# Patient Record
Sex: FEMALE | Race: WHITE | NOT HISPANIC OR LATINO | Employment: FULL TIME | URBAN - METROPOLITAN AREA
[De-identification: names, ages, dates, MRNs, and addresses within clinical notes are randomized per-mention and may not be internally consistent; named-entity substitution may affect disease eponyms.]

---

## 2018-01-12 ENCOUNTER — OFFICE VISIT (OUTPATIENT)
Dept: PSYCHIATRY | Facility: CLINIC | Age: 53
End: 2018-01-12
Payer: COMMERCIAL

## 2018-01-12 DIAGNOSIS — F43.20 ADJUSTMENT DISORDER, UNSPECIFIED TYPE: Primary | ICD-10-CM

## 2018-01-12 PROCEDURE — 90847 FAMILY PSYTX W/PT 50 MIN: CPT | Mod: S$GLB,,, | Performed by: SOCIAL WORKER

## 2018-01-15 NOTE — PROGRESS NOTES
Family Psychotherapy (PhD/LCSW)    1/12/2018    Site: [facility]    Length of service: 45    Therapeutic intervention: 90847-Family therapy with patient; needed because adjustment, communication issues, addiction     Persons present: patient, spouse and son     Interval history: Patient, patient's spouse, and patient's son presented to the clinic today for family therapy session.  Patient is tearful intermittently during session.  Reflects on her son's history of opioid use disorder.  She is optimistic about his recovery, as he has entered into Suboxone maintenance program at [facility] with Dr. Betancourt.  He has been attending AA meetings.  Patient and her spouse reflect on how they want to set adequate boundaries and limits for their son, as he enters recovery.  Discussed the construct of codependency with patient and her family.  Discussed boundaries, limit setting, and enabling behaviors.  Patient's son reports that he will be engaging in individual therapy in the clinic.  The family met with Dr. Betancourt prior to this appointment.  Recommended that patient and her spouse attend Al-Anon meetings.  They appear amenable to feedback from Sw.        Target symptoms: substance abuse, adjustment     Patient's interpersonal/verbal exchanges: 90847-Family therapy with patient:  active listening, frequent questions and self-disclosure    Progress toward goals: progressing slowly    Diagnosis: Adjustment disorder, unspecified type     Plan: family psychotherapy    Return to clinic: as needed

## 2020-04-21 DIAGNOSIS — Z01.84 ANTIBODY RESPONSE EXAMINATION: ICD-10-CM

## 2021-06-04 ENCOUNTER — OFFICE VISIT (OUTPATIENT)
Dept: PRIMARY CARE CLINIC | Facility: CLINIC | Age: 56
End: 2021-06-04
Payer: COMMERCIAL

## 2021-06-04 VITALS
HEART RATE: 78 BPM | RESPIRATION RATE: 16 BRPM | WEIGHT: 174.06 LBS | OXYGEN SATURATION: 98 % | BODY MASS INDEX: 27.97 KG/M2 | DIASTOLIC BLOOD PRESSURE: 68 MMHG | SYSTOLIC BLOOD PRESSURE: 124 MMHG | HEIGHT: 66 IN

## 2021-06-04 DIAGNOSIS — Z12.31 ENCOUNTER FOR SCREENING MAMMOGRAM FOR MALIGNANT NEOPLASM OF BREAST: ICD-10-CM

## 2021-06-04 DIAGNOSIS — Z11.4 ENCOUNTER FOR SCREENING FOR HIV: ICD-10-CM

## 2021-06-04 DIAGNOSIS — Z12.31 VISIT FOR SCREENING MAMMOGRAM: ICD-10-CM

## 2021-06-04 DIAGNOSIS — Z12.11 ENCOUNTER FOR SCREENING FOR MALIGNANT NEOPLASM OF COLON: ICD-10-CM

## 2021-06-04 DIAGNOSIS — E28.39 HYPOESTROGENISM: ICD-10-CM

## 2021-06-04 DIAGNOSIS — R79.89 LOW TESTOSTERONE LEVEL IN FEMALE: ICD-10-CM

## 2021-06-04 DIAGNOSIS — Z11.59 NEED FOR HEPATITIS C SCREENING TEST: ICD-10-CM

## 2021-06-04 DIAGNOSIS — R79.89 LOW SERUM PROGESTERONE: ICD-10-CM

## 2021-06-04 DIAGNOSIS — E03.9 HYPOTHYROIDISM, UNSPECIFIED TYPE: ICD-10-CM

## 2021-06-04 DIAGNOSIS — Z00.00 HEALTH CARE MAINTENANCE: ICD-10-CM

## 2021-06-04 DIAGNOSIS — B20 HIV INFECTION, UNSPECIFIED SYMPTOM STATUS: Primary | ICD-10-CM

## 2021-06-04 PROCEDURE — 99999 PR PBB SHADOW E&M-EST. PATIENT-LVL III: CPT | Mod: PBBFAC,,, | Performed by: FAMILY MEDICINE

## 2021-06-04 PROCEDURE — 99999 PR PBB SHADOW E&M-EST. PATIENT-LVL III: ICD-10-PCS | Mod: PBBFAC,,, | Performed by: FAMILY MEDICINE

## 2021-06-04 PROCEDURE — 99386 PR PREVENTIVE VISIT,NEW,40-64: ICD-10-PCS | Mod: S$GLB,,, | Performed by: FAMILY MEDICINE

## 2021-06-04 PROCEDURE — 99386 PREV VISIT NEW AGE 40-64: CPT | Mod: S$GLB,,, | Performed by: FAMILY MEDICINE

## 2021-06-07 ENCOUNTER — TELEPHONE (OUTPATIENT)
Dept: PRIMARY CARE CLINIC | Facility: CLINIC | Age: 56
End: 2021-06-07

## 2021-06-07 PROBLEM — E03.9 HYPOTHYROIDISM: Status: ACTIVE | Noted: 2021-06-07

## 2021-06-07 PROBLEM — E28.39 HYPOESTROGENISM: Status: ACTIVE | Noted: 2021-06-07

## 2021-06-07 PROBLEM — R79.89 LOW SERUM PROGESTERONE: Status: ACTIVE | Noted: 2021-06-07

## 2021-06-07 PROBLEM — R79.89 LOW TESTOSTERONE LEVEL IN FEMALE: Status: ACTIVE | Noted: 2021-06-07

## 2021-06-11 ENCOUNTER — TELEPHONE (OUTPATIENT)
Dept: PRIMARY CARE CLINIC | Facility: CLINIC | Age: 56
End: 2021-06-11

## 2021-06-15 LAB
ALBUMIN SERPL-MCNC: 4.2 G/DL (ref 3.6–5.1)
ALBUMIN/GLOB SERPL: 1.5 (CALC) (ref 1–2.5)
ALP SERPL-CCNC: 56 U/L (ref 37–153)
ALT SERPL-CCNC: 11 U/L (ref 6–29)
AST SERPL-CCNC: 14 U/L (ref 10–35)
BASOPHILS # BLD AUTO: 53 CELLS/UL (ref 0–200)
BASOPHILS NFR BLD AUTO: 0.9 %
BILIRUB SERPL-MCNC: 0.5 MG/DL (ref 0.2–1.2)
BUN SERPL-MCNC: 10 MG/DL (ref 7–25)
BUN/CREAT SERPL: NORMAL (CALC) (ref 6–22)
CALCIUM SERPL-MCNC: 9.1 MG/DL (ref 8.6–10.4)
CHLORIDE SERPL-SCNC: 103 MMOL/L (ref 98–110)
CHOLEST SERPL-MCNC: 206 MG/DL
CHOLEST/HDLC SERPL: 2.7 (CALC)
CO2 SERPL-SCNC: 27 MMOL/L (ref 20–32)
CREAT SERPL-MCNC: 0.66 MG/DL (ref 0.5–1.05)
DHEA-S SERPL-MCNC: 248 MCG/DL (ref 8–188)
EOSINOPHIL # BLD AUTO: 153 CELLS/UL (ref 15–500)
EOSINOPHIL NFR BLD AUTO: 2.6 %
ERYTHROCYTE [DISTWIDTH] IN BLOOD BY AUTOMATED COUNT: 12.4 % (ref 11–15)
ESTRADIOL SERPL-MCNC: <15 PG/ML
FSH SERPL-ACNC: 55.4 MIU/ML
GLOBULIN SER CALC-MCNC: 2.8 G/DL (CALC) (ref 1.9–3.7)
GLUCOSE SERPL-MCNC: 85 MG/DL (ref 65–99)
HCT VFR BLD AUTO: 35.4 % (ref 35–45)
HCV AB S/CO SERPL IA: 0.01
HCV AB SERPL QL IA: NORMAL
HDLC SERPL-MCNC: 76 MG/DL
HGB BLD-MCNC: 11.8 G/DL (ref 11.7–15.5)
HIV 1+2 AB+HIV1 P24 AG SERPL QL IA: NORMAL
LDLC SERPL CALC-MCNC: 115 MG/DL (CALC)
LH SERPL-ACNC: 24.8 MIU/ML
LYMPHOCYTES # BLD AUTO: 1540 CELLS/UL (ref 850–3900)
LYMPHOCYTES NFR BLD AUTO: 26.1 %
MCH RBC QN AUTO: 28.5 PG (ref 27–33)
MCHC RBC AUTO-ENTMCNC: 33.3 G/DL (ref 32–36)
MCV RBC AUTO: 85.5 FL (ref 80–100)
MONOCYTES # BLD AUTO: 401 CELLS/UL (ref 200–950)
MONOCYTES NFR BLD AUTO: 6.8 %
NEUTROPHILS # BLD AUTO: 3752 CELLS/UL (ref 1500–7800)
NEUTROPHILS NFR BLD AUTO: 63.6 %
NONHDLC SERPL-MCNC: 130 MG/DL (CALC)
PLATELET # BLD AUTO: 265 THOUSAND/UL (ref 140–400)
PMV BLD REES-ECKER: 10.1 FL (ref 7.5–12.5)
POTASSIUM SERPL-SCNC: 4.3 MMOL/L (ref 3.5–5.3)
PROGEST SERPL-MCNC: <0.5 NG/ML
PROT SERPL-MCNC: 7 G/DL (ref 6.1–8.1)
RBC # BLD AUTO: 4.14 MILLION/UL (ref 3.8–5.1)
SODIUM SERPL-SCNC: 139 MMOL/L (ref 135–146)
T4 FREE SERPL-MCNC: 1 NG/DL (ref 0.8–1.8)
TESTOST FREE SERPL-MCNC: 2.7 PG/ML (ref 0.2–5)
TRIGL SERPL-MCNC: 61 MG/DL
TSH SERPL-ACNC: 1.16 MIU/L
WBC # BLD AUTO: 5.9 THOUSAND/UL (ref 3.8–10.8)

## 2021-07-06 ENCOUNTER — PATIENT MESSAGE (OUTPATIENT)
Dept: ADMINISTRATIVE | Facility: HOSPITAL | Age: 56
End: 2021-07-06

## 2021-07-07 ENCOUNTER — PATIENT MESSAGE (OUTPATIENT)
Dept: RHEUMATOLOGY | Facility: CLINIC | Age: 56
End: 2021-07-07

## 2021-07-08 ENCOUNTER — PATIENT OUTREACH (OUTPATIENT)
Dept: ADMINISTRATIVE | Facility: HOSPITAL | Age: 56
End: 2021-07-08

## 2021-07-14 ENCOUNTER — TELEPHONE (OUTPATIENT)
Dept: PRIMARY CARE CLINIC | Facility: CLINIC | Age: 56
End: 2021-07-14

## 2021-07-14 ENCOUNTER — TELEPHONE (OUTPATIENT)
Dept: RHEUMATOLOGY | Facility: CLINIC | Age: 56
End: 2021-07-14

## 2021-09-16 ENCOUNTER — IMMUNIZATION (OUTPATIENT)
Dept: PRIMARY CARE CLINIC | Facility: CLINIC | Age: 56
End: 2021-09-16
Payer: COMMERCIAL

## 2021-09-16 DIAGNOSIS — Z23 NEED FOR VACCINATION: Primary | ICD-10-CM

## 2021-09-16 PROCEDURE — 91301 COVID-19, MRNA, LNP-S, PF, 100 MCG/0.5 ML DOSE VACCINE: ICD-10-PCS | Mod: ,,, | Performed by: EMERGENCY MEDICINE

## 2021-09-16 PROCEDURE — 91301 COVID-19, MRNA, LNP-S, PF, 100 MCG/0.5 ML DOSE VACCINE: CPT | Mod: ,,, | Performed by: EMERGENCY MEDICINE

## 2021-09-16 PROCEDURE — 0012A COVID-19, MRNA, LNP-S, PF, 100 MCG/0.5 ML DOSE VACCINE: CPT | Mod: CV19,,, | Performed by: EMERGENCY MEDICINE

## 2021-09-16 PROCEDURE — 0012A COVID-19, MRNA, LNP-S, PF, 100 MCG/0.5 ML DOSE VACCINE: ICD-10-PCS | Mod: CV19,,, | Performed by: EMERGENCY MEDICINE

## 2021-10-05 ENCOUNTER — PATIENT MESSAGE (OUTPATIENT)
Dept: ADMINISTRATIVE | Facility: HOSPITAL | Age: 56
End: 2021-10-05

## 2021-12-30 ENCOUNTER — LAB VISIT (OUTPATIENT)
Dept: PRIMARY CARE CLINIC | Facility: OTHER | Age: 56
End: 2021-12-30
Payer: COMMERCIAL

## 2021-12-30 DIAGNOSIS — R52 BODY ACHES: Primary | ICD-10-CM

## 2021-12-30 LAB
CTP QC/QA: YES
SARS-COV-2 AG RESP QL IA.RAPID: POSITIVE

## 2022-05-09 ENCOUNTER — OFFICE VISIT (OUTPATIENT)
Dept: PRIMARY CARE CLINIC | Facility: CLINIC | Age: 57
End: 2022-05-09
Payer: COMMERCIAL

## 2022-05-09 DIAGNOSIS — R82.90 MALODOROUS URINE: Primary | ICD-10-CM

## 2022-05-09 PROCEDURE — 99214 PR OFFICE/OUTPT VISIT, EST, LEVL IV, 30-39 MIN: ICD-10-PCS | Mod: 95,,, | Performed by: STUDENT IN AN ORGANIZED HEALTH CARE EDUCATION/TRAINING PROGRAM

## 2022-05-09 PROCEDURE — 99214 OFFICE O/P EST MOD 30 MIN: CPT | Mod: 95,,, | Performed by: STUDENT IN AN ORGANIZED HEALTH CARE EDUCATION/TRAINING PROGRAM

## 2022-05-09 RX ORDER — LEVOFLOXACIN 250 MG/1
250 TABLET ORAL DAILY
Qty: 3 TABLET | Refills: 0 | Status: SHIPPED | OUTPATIENT
Start: 2022-05-09 | End: 2022-05-12

## 2022-05-09 RX ORDER — LEVOFLOXACIN 750 MG/1
750 TABLET ORAL DAILY
Status: CANCELLED | OUTPATIENT
Start: 2022-05-09

## 2022-05-09 NOTE — PROGRESS NOTES
The patient location is: [place]  The chief complaint leading to consultation is: Dysuria    Visit type: audiovisual    Face to Face time with patient: 10  15 minutes of total time spent on the encounter, which includes face to face time and non-face to face time preparing to see the patient (eg, review of tests), Obtaining and/or reviewing separately obtained history, Documenting clinical information in the electronic or other health record, Independently interpreting results (not separately reported) and communicating results to the patient/family/caregiver, or Care coordination (not separately reported).     Each patient to whom he or she provides medical services by telemedicine is:  (1) informed of the relationship between the physician and patient and the respective role of any other health care provider with respect to management of the patient; and (2) notified that he or she may decline to receive medical services by telemedicine and may withdraw from such care at any time.    Notes:     HPI:  Patient reports that symptoms of strong odor began within last 2 hours. Reports urine had abnormal odor recently. No pain, urgency or itching. Has had UTI in the past, typically sees Dr. Neal and . Not frequent. This is typical onset. Has not been drinking as much as usual. Doesn't eat asparagus. Is going out of town on Wednesday. Works at [employer].    Onset?: 2 hours ago  Pain with urination?: No  Increased frequency/urgency?: No  Recurrent?: Yes  Bubble bath, bath bomb, douching?: None  Urine color?: Slightly darker than normal  Blood in urine?: No  Constipation?: None  Interventions/medications?: Increased water intake    Review of Systems   Constitutional: Positive for fatigue. Negative for chills, diaphoresis and fever.   HENT: Negative for congestion, sinus pressure, sneezing and sore throat.    Respiratory: Negative for cough and shortness of breath.    Cardiovascular:  Negative for chest pain and palpitations.   Gastrointestinal: Negative for abdominal pain, diarrhea, nausea and vomiting.   Genitourinary: Negative for decreased urine volume, dysuria, flank pain, frequency and urgency.        Malodorous urine   Skin: Negative for rash and wound.   Neurological: Positive for headaches.        Physical Exam  Vitals reviewed.   Constitutional:       General: She is not in acute distress.     Appearance: Normal appearance. She is not ill-appearing.   HENT:      Head: Normocephalic and atraumatic.   Eyes:      General:         Right eye: No discharge.         Left eye: No discharge.      Conjunctiva/sclera: Conjunctivae normal.   Cardiovascular:      Rate and Rhythm: Normal rate and regular rhythm.      Pulses: Normal pulses.      Heart sounds: No murmur heard.  Pulmonary:      Effort: Pulmonary effort is normal.      Breath sounds: Normal breath sounds.   Abdominal:      Palpations: Abdomen is soft.      Tenderness: There is no abdominal tenderness.   Musculoskeletal:         General: No deformity.   Skin:     General: Skin is warm and dry.      Coloration: Skin is not jaundiced.   Neurological:      General: No focal deficit present.      Mental Status: She is alert and oriented to person, place, and time.   Psychiatric:         Mood and Affect: Mood normal.         Behavior: Behavior normal.       Plan:  Malodorous urine  -     levoFLOXacin (LEVAQUIN) 250 MG tablet; Take 1 tablet (250 mg total) by mouth once daily. Take at first sign of increased frequency, urgency, or pain with urination for 3 days  Dispense: 3 tablet; Refill: 0  - Recommend increased fluid intake at this time and continued reassessment of symptoms  - Will prescribe levofloxacin at this time as patient worked well for symptoms in the past and has upcoming business trip. Encouraged patient to begin antibiotic only if symptoms of dysuria, increased frequency, or urgency develop    RTC prn

## 2022-05-31 ENCOUNTER — PATIENT MESSAGE (OUTPATIENT)
Dept: ADMINISTRATIVE | Facility: HOSPITAL | Age: 57
End: 2022-05-31
Payer: COMMERCIAL

## 2022-07-05 ENCOUNTER — PATIENT OUTREACH (OUTPATIENT)
Dept: ADMINISTRATIVE | Facility: HOSPITAL | Age: 57
End: 2022-07-05
Payer: COMMERCIAL

## 2022-07-05 NOTE — PROGRESS NOTES
Chart review done.  updated. Immunizations reviewed & updated. Care Everywhere updated.  COLON SCREENING WILL BE ADDRESSED AT VISIT ON 7/6/22

## 2022-07-06 ENCOUNTER — PATIENT OUTREACH (OUTPATIENT)
Dept: ADMINISTRATIVE | Facility: HOSPITAL | Age: 57
End: 2022-07-06
Payer: COMMERCIAL

## 2022-07-06 ENCOUNTER — OFFICE VISIT (OUTPATIENT)
Dept: PRIMARY CARE CLINIC | Facility: CLINIC | Age: 57
End: 2022-07-06
Payer: COMMERCIAL

## 2022-07-06 VITALS
BODY MASS INDEX: 28.51 KG/M2 | HEART RATE: 83 BPM | HEIGHT: 66 IN | SYSTOLIC BLOOD PRESSURE: 130 MMHG | RESPIRATION RATE: 18 BRPM | WEIGHT: 177.38 LBS | TEMPERATURE: 98 F | DIASTOLIC BLOOD PRESSURE: 80 MMHG | OXYGEN SATURATION: 98 %

## 2022-07-06 DIAGNOSIS — E03.9 HYPOTHYROIDISM, UNSPECIFIED TYPE: ICD-10-CM

## 2022-07-06 DIAGNOSIS — R79.89 LOW SERUM PROGESTERONE: ICD-10-CM

## 2022-07-06 DIAGNOSIS — E28.39 HYPOESTROGENISM: ICD-10-CM

## 2022-07-06 DIAGNOSIS — Z01.419 ROUTINE GYNECOLOGICAL EXAMINATION: ICD-10-CM

## 2022-07-06 DIAGNOSIS — Z23 NEED FOR PNEUMOCOCCAL VACCINATION: ICD-10-CM

## 2022-07-06 DIAGNOSIS — Z12.11 ENCOUNTER FOR SCREENING FOR MALIGNANT NEOPLASM OF COLON: ICD-10-CM

## 2022-07-06 DIAGNOSIS — N30.00 ACUTE CYSTITIS WITHOUT HEMATURIA: Primary | ICD-10-CM

## 2022-07-06 DIAGNOSIS — Z13.220 SCREENING CHOLESTEROL LEVEL: ICD-10-CM

## 2022-07-06 DIAGNOSIS — Z12.31 ENCOUNTER FOR SCREENING MAMMOGRAM FOR MALIGNANT NEOPLASM OF BREAST: ICD-10-CM

## 2022-07-06 LAB
BILIRUB SERPL-MCNC: ABNORMAL MG/DL
BLOOD URINE, POC: ABNORMAL
CLARITY, POC UA: ABNORMAL
COLOR, POC UA: ABNORMAL
GLUCOSE UR QL STRIP: NEGATIVE
KETONES UR QL STRIP: NEGATIVE
LEUKOCYTE ESTERASE URINE, POC: ABNORMAL
NITRITE, POC UA: NEGATIVE
PH, POC UA: 5
PROTEIN, POC: ABNORMAL
SPECIFIC GRAVITY, POC UA: 1.02
UROBILINOGEN, POC UA: NEGATIVE

## 2022-07-06 PROCEDURE — 90732 PNEUMOCOCCAL POLYSACCHARIDE VACCINE 23-VALENT =>2YO SQ IM: ICD-10-PCS | Mod: S$GLB,,, | Performed by: FAMILY MEDICINE

## 2022-07-06 PROCEDURE — 81002 URINALYSIS NONAUTO W/O SCOPE: CPT | Mod: S$GLB,,, | Performed by: FAMILY MEDICINE

## 2022-07-06 PROCEDURE — 99214 PR OFFICE/OUTPT VISIT, EST, LEVL IV, 30-39 MIN: ICD-10-PCS | Mod: 25,S$GLB,, | Performed by: FAMILY MEDICINE

## 2022-07-06 PROCEDURE — 90732 PPSV23 VACC 2 YRS+ SUBQ/IM: CPT | Mod: S$GLB,,, | Performed by: FAMILY MEDICINE

## 2022-07-06 PROCEDURE — 99999 PR PBB SHADOW E&M-EST. PATIENT-LVL V: CPT | Mod: PBBFAC,,, | Performed by: FAMILY MEDICINE

## 2022-07-06 PROCEDURE — 81002 POCT URINE DIPSTICK WITHOUT MICROSCOPE: ICD-10-PCS | Mod: S$GLB,,, | Performed by: FAMILY MEDICINE

## 2022-07-06 PROCEDURE — 99999 PR PBB SHADOW E&M-EST. PATIENT-LVL V: ICD-10-PCS | Mod: PBBFAC,,, | Performed by: FAMILY MEDICINE

## 2022-07-06 PROCEDURE — 90471 IMMUNIZATION ADMIN: CPT | Mod: S$GLB,,, | Performed by: FAMILY MEDICINE

## 2022-07-06 PROCEDURE — 99214 OFFICE O/P EST MOD 30 MIN: CPT | Mod: 25,S$GLB,, | Performed by: FAMILY MEDICINE

## 2022-07-06 PROCEDURE — 90471 PNEUMOCOCCAL POLYSACCHARIDE VACCINE 23-VALENT =>2YO SQ IM: ICD-10-PCS | Mod: S$GLB,,, | Performed by: FAMILY MEDICINE

## 2022-07-06 RX ORDER — LEVOFLOXACIN 500 MG/1
500 TABLET, FILM COATED ORAL DAILY
Qty: 10 TABLET | Refills: 0 | Status: SHIPPED | OUTPATIENT
Start: 2022-07-06 | End: 2022-07-16

## 2022-07-06 RX ORDER — PHENAZOPYRIDINE HYDROCHLORIDE 200 MG/1
200 TABLET, FILM COATED ORAL 3 TIMES DAILY PRN
Qty: 15 TABLET | Refills: 0 | Status: SHIPPED | OUTPATIENT
Start: 2022-07-06 | End: 2022-07-16

## 2022-07-06 NOTE — PROGRESS NOTES
Chart reviewed for overdue Colon Cancer Screening.  Cologuard ordered 7/6/22. Port message sent to patient on 5/31/22

## 2022-07-06 NOTE — PATIENT INSTRUCTIONS
Levaquin 5 mg 1 p.o. q.d. times 10 days   Pyridium 200 mg 1 p.o. t.i.d. x5 days   If symptoms stay return 2 weeks and will get a urine culture  If recurred problem, may need to see urologist due to history of multiple renal stones in the past   Labs due CBCs CMP lipids T4 TSH when desires

## 2022-07-06 NOTE — PROGRESS NOTES
Subjective:       Patient ID: Janae Tobias is a 57 y.o. female.    Chief Complaint: Urinary Tract Infection    HPI: 57 yo WF -possible UTI--started Sunday 3 days ago--had little bit of pressure at the end urination--no frequency--+urgency--no retention--+dysuria burning at the end urination--urine initially look cloudy better since strength and will liquid--+hematuria Monday a.m.--Hx UTI's past occas once year .  History nephrolithiasis years ago.  Had lithotripsy. --1 stone caught in the ureter was removed with basket. LMP 5 yrs ago.     ROS:  Skin: no psoriasis, eczema, skin cancer  HEENT: No headache, ocular pain, blurred vision, diplopia, epistaxis, hoarseness change in voice, thyroid --was on armour thyroid off meds x years   Lung: No pneumonia, asthma, Tb, wheezing, SOB,no smoking   Heart: No chest pain, ankle edema, palpitations, MI, boubacar murmur, hypertension, hyperlipidemia  Abdomen: No nausea, vomiting, diarrhea, constipation, ulcers, hepatitis, gallbladder disease, melena, hematochezia, hematemesis  : no UTI, renal disease, history nephrolithiasis x5 see3 HPI UTI   GYN uterine ablation--BTL--last Pap smear about 5 year--mammogram--5 years  MS: no fractures, O/A, lupus, rheumatoid, gout  Neuro: No dizziness, LOC, seizures   No diabetes, no anemia, no anxiety, no depression    4 children work out pt therapy lives with      Objective:   Physical Exam:  General: Well nourished, well developed, no acute distress +overweight  Skin: No lesions  HEENT: Eyes PERRLA, EOM intact, nose patent, throat non-erythematous ears TMs clear  NECK: Supple, no bruits, No JVD, no nodes  Lungs: Clear, no rales, rhonchi, wheezing  Heart: Regular rate and rhythm, no murmurs, gallops, or rubs  Abdomen: flat, bowel sounds positive, no tenderness, or organomegaly  MS: Range of motion and muscle strength intact slight swelling of the left 3rd PIP joint probable osteoarthritis  Neuro: Alert, CN intact, oriented X  3  Extremities: No cyanosis, clubbing, or edema         Assessment:       1. Acute cystitis without hematuria    2. Routine gynecological examination    3. Encounter for screening mammogram for malignant neoplasm of breast    4. Screening cholesterol level    5. Need for pneumococcal vaccination    6. Encounter for screening for malignant neoplasm of colon    7. Hypothyroidism, unspecified type    8. Low serum progesterone    9. Hypoestrogenism        Plan:       Acute cystitis without hematuria  -     POCT urine dipstick without microscope    Routine gynecological examination  -     Ambulatory referral/consult to Obstetrics / Gynecology; Future; Expected date: 07/13/2022    Encounter for screening mammogram for malignant neoplasm of breast  -     Mammo Digital Screening Bilat w/ Genaro; Future; Expected date: 07/06/2022    Screening cholesterol level  -     Lipid Panel; Future; Expected date: 07/06/2022    Need for pneumococcal vaccination  -     (In Office Administered) Pneumococcal Polysaccharide Vaccine (23 Valent) (SQ/IM)    Encounter for screening for malignant neoplasm of colon  -     Cologuard Screening (Multitarget Stool DNA); Future; Expected date: 07/06/2022    Hypothyroidism, unspecified type    Low serum progesterone    Hypoestrogenism    Other orders  -     levoFLOXacin (LEVAQUIN) 500 MG tablet; Take 1 tablet (500 mg total) by mouth once daily. for 10 days  Dispense: 10 tablet; Refill: 0  -     phenazopyridine (PYRIDIUM) 200 MG tablet; Take 1 tablet (200 mg total) by mouth 3 (three) times daily as needed.  Dispense: 15 tablet; Refill: 0        Main reason for visit  UTI--levaquin 500 1 po qd x 10 days--patient has taken this antibiotic in the past without difficulty some antibiotics maker 6 peridium 200 mg 1 p.o. t.i.d. x5 days  Other medical issues  Multiple endocrine issues--history hypothyroidism/low testosterone/low estrogen/low progesterone/DHEA/Iodine   Overweight == exercise try to get to ideal  body weight  History to nephrolithiasis no recent problems had x5  Hypothyroidism on [place] thyroid--in the past presently thyroid was normal in June 2021  CBCs CMP lipids T4 TSH when desires  Health maintenance Pap smear in pneumococcal vaccine colorectal screen shingles vaccine COVID vaccine lipid mammogram

## 2022-07-11 ENCOUNTER — PATIENT MESSAGE (OUTPATIENT)
Dept: ADMINISTRATIVE | Facility: HOSPITAL | Age: 57
End: 2022-07-11
Payer: COMMERCIAL

## 2022-10-10 ENCOUNTER — PATIENT MESSAGE (OUTPATIENT)
Dept: ADMINISTRATIVE | Facility: HOSPITAL | Age: 57
End: 2022-10-10
Payer: COMMERCIAL

## 2022-10-20 ENCOUNTER — OFFICE VISIT (OUTPATIENT)
Dept: PRIMARY CARE CLINIC | Facility: CLINIC | Age: 57
End: 2022-10-20
Payer: COMMERCIAL

## 2022-10-20 VITALS
OXYGEN SATURATION: 98 % | HEART RATE: 92 BPM | RESPIRATION RATE: 16 BRPM | WEIGHT: 177.38 LBS | TEMPERATURE: 100 F | BODY MASS INDEX: 28.51 KG/M2 | DIASTOLIC BLOOD PRESSURE: 84 MMHG | HEIGHT: 66 IN | SYSTOLIC BLOOD PRESSURE: 130 MMHG

## 2022-10-20 DIAGNOSIS — R05.9 COUGH, UNSPECIFIED TYPE: ICD-10-CM

## 2022-10-20 DIAGNOSIS — J06.9 ACUTE URI: Primary | ICD-10-CM

## 2022-10-20 LAB
CTP QC/QA: YES
CTP QC/QA: YES
FLUAV AG NPH QL: NEGATIVE
FLUBV AG NPH QL: NEGATIVE
SARS-COV-2 RDRP RESP QL NAA+PROBE: POSITIVE

## 2022-10-20 PROCEDURE — 99214 OFFICE O/P EST MOD 30 MIN: CPT | Mod: S$GLB,,, | Performed by: STUDENT IN AN ORGANIZED HEALTH CARE EDUCATION/TRAINING PROGRAM

## 2022-10-20 PROCEDURE — 87804 POCT INFLUENZA A/B: ICD-10-PCS | Mod: QW,S$GLB,, | Performed by: STUDENT IN AN ORGANIZED HEALTH CARE EDUCATION/TRAINING PROGRAM

## 2022-10-20 PROCEDURE — 87635 SARS-COV-2 COVID-19 AMP PRB: CPT | Mod: QW,S$GLB,, | Performed by: STUDENT IN AN ORGANIZED HEALTH CARE EDUCATION/TRAINING PROGRAM

## 2022-10-20 PROCEDURE — 99999 PR PBB SHADOW E&M-EST. PATIENT-LVL IV: CPT | Mod: PBBFAC,,, | Performed by: STUDENT IN AN ORGANIZED HEALTH CARE EDUCATION/TRAINING PROGRAM

## 2022-10-20 PROCEDURE — 99999 PR PBB SHADOW E&M-EST. PATIENT-LVL IV: ICD-10-PCS | Mod: PBBFAC,,, | Performed by: STUDENT IN AN ORGANIZED HEALTH CARE EDUCATION/TRAINING PROGRAM

## 2022-10-20 PROCEDURE — 87804 INFLUENZA ASSAY W/OPTIC: CPT | Mod: QW,S$GLB,, | Performed by: STUDENT IN AN ORGANIZED HEALTH CARE EDUCATION/TRAINING PROGRAM

## 2022-10-20 PROCEDURE — 87635: ICD-10-PCS | Mod: QW,S$GLB,, | Performed by: STUDENT IN AN ORGANIZED HEALTH CARE EDUCATION/TRAINING PROGRAM

## 2022-10-20 PROCEDURE — 99214 PR OFFICE/OUTPT VISIT, EST, LEVL IV, 30-39 MIN: ICD-10-PCS | Mod: S$GLB,,, | Performed by: STUDENT IN AN ORGANIZED HEALTH CARE EDUCATION/TRAINING PROGRAM

## 2022-10-20 RX ORDER — AZITHROMYCIN 250 MG/1
TABLET, FILM COATED ORAL
Qty: 6 TABLET | Refills: 0 | Status: SHIPPED | OUTPATIENT
Start: 2022-10-20 | End: 2022-10-25

## 2022-10-20 RX ORDER — CODEINE PHOSPHATE AND GUAIFENESIN 10; 100 MG/5ML; MG/5ML
5 SOLUTION ORAL EVERY 6 HOURS PRN
Qty: 120 ML | Refills: 0 | Status: SHIPPED | OUTPATIENT
Start: 2022-10-20 | End: 2023-10-19

## 2022-10-20 RX ORDER — BENZONATATE 100 MG/1
100 CAPSULE ORAL 3 TIMES DAILY PRN
Qty: 30 CAPSULE | Refills: 0 | Status: SHIPPED | OUTPATIENT
Start: 2022-10-20 | End: 2022-10-30

## 2022-10-20 NOTE — PROGRESS NOTES
"Subjective:       Patient ID: Janae Tobias is a 57 y.o. female.    Chief Complaint: Cough, Nasal Congestion, Fever, Generalized Body Aches, Nausea, and Fatigue      HPI:  57 y.o. female presents to [facility] with viral syndrome    Patient reports symptoms began around 10/11-10/12/2022. Having fever chills. Now having coughing and throat irritation. Trying OTC medications and now symptoms are worsening. Feels like ears are full at this time. Has weakness, nausea and fever. Temp is 100 today in clinic.    Sick contacts?: Yes, granddaughter, , daughter  Fever?: Yes, subjective, 100 today  Shortness of breath?: Mild, with cough  Sore throat?: Scratchiness  Cough?: Yes, non-productive  Loss of taste smell?: Yes  Received flu vaccine?: Yes   Received COVID vaccine?: Yes, Moderna x2, had COVID in January. Didn't swab this episode    Review of Systems   Constitutional:  Positive for chills, diaphoresis, fatigue and fever.        Body aches   HENT:  Positive for congestion, sinus pressure, sinus pain (Burning) and sore throat (scratchiness, 1 week ago). Negative for ear pain (Fullness) and trouble swallowing.    Respiratory:  Positive for cough and shortness of breath.    Cardiovascular:  Negative for chest pain and palpitations.   Gastrointestinal:  Positive for abdominal pain and nausea. Negative for diarrhea and vomiting.   Musculoskeletal:  Positive for arthralgias (aches).   Skin:  Negative for rash and wound.   Neurological:  Positive for headaches.     Objective:      Vitals:    10/20/22 0814   BP: 130/84   BP Location: Right arm   Patient Position: Sitting   BP Method: Medium (Manual)   Pulse: 92   Resp: 16   Temp: 100 °F (37.8 °C)   TempSrc: Oral   SpO2: 98%   Weight: 80.4 kg (177 lb 5.8 oz)   Height: 5' 6" (1.676 m)     Physical Exam  Vitals reviewed.   Constitutional:       General: She is not in acute distress.     Appearance: Normal appearance. She is not ill-appearing.   HENT:      Head: " Normocephalic and atraumatic.      Right Ear: Tympanic membrane, ear canal and external ear normal. There is no impacted cerumen.      Left Ear: Tympanic membrane, ear canal and external ear normal. There is no impacted cerumen.      Nose:      Right Sinus: No maxillary sinus tenderness or frontal sinus tenderness.      Left Sinus: No maxillary sinus tenderness or frontal sinus tenderness.      Mouth/Throat:      Mouth: Mucous membranes are moist.      Pharynx: Oropharynx is clear. No oropharyngeal exudate or posterior oropharyngeal erythema.   Eyes:      General:         Right eye: No discharge.         Left eye: No discharge.      Conjunctiva/sclera: Conjunctivae normal.   Cardiovascular:      Rate and Rhythm: Normal rate and regular rhythm.      Pulses: Normal pulses.      Heart sounds: Normal heart sounds.   Pulmonary:      Effort: Pulmonary effort is normal.      Breath sounds: Normal breath sounds.   Musculoskeletal:         General: No deformity.      Cervical back: Neck supple. No rigidity.   Lymphadenopathy:      Cervical: No cervical adenopathy.   Skin:     General: Skin is warm and dry.      Coloration: Skin is not jaundiced.   Neurological:      General: No focal deficit present.      Mental Status: She is alert and oriented to person, place, and time.   Psychiatric:         Mood and Affect: Mood normal.         Behavior: Behavior normal.           Lab Results   Component Value Date     06/11/2021    K 4.3 06/11/2021     06/11/2021    CO2 27 06/11/2021    BUN 10 06/11/2021    CREATININE 0.66 06/11/2021     No results found for: HGBA1C  No results found for: BNP, BNPTRIAGEBLO    Lab Results   Component Value Date    WBC 5.9 06/11/2021    HGB 11.8 06/11/2021    HCT 35.4 06/11/2021     06/11/2021     Lab Results   Component Value Date    CHOL 206 (H) 06/11/2021    HDL 76 06/11/2021    LDLCALC 115 (H) 06/11/2021    TRIG 61 06/11/2021          Current Outpatient Medications:      azithromycin (Z-ERIKA) 250 MG tablet, Take 2 tablets by mouth on day 1; Take 1 tablet by mouth on days 2-5, Disp: 6 tablet, Rfl: 0    benzonatate (TESSALON) 100 MG capsule, Take 1 capsule (100 mg total) by mouth 3 (three) times daily as needed for Cough., Disp: 30 capsule, Rfl: 0    guaiFENesin-codeine 100-10 mg/5 ml (TUSSI-ORGANIDIN NR)  mg/5 mL syrup, Take 5 mLs by mouth every 6 (six) hours as needed for Cough. Do not drive or perform dangerous tasks while taking. Do not take if short of breath, Disp: 120 mL, Rfl: 0        Assessment:       1. Acute URI    2. Cough, unspecified type           Plan:       Acute URI  Cough, unspecified type  -     POCT COVID-19 Rapid Screening  -     POCT Influenza A/B  -     azithromycin (Z-ERIKA) 250 MG tablet; Take 2 tablets by mouth on day 1; Take 1 tablet by mouth on days 2-5  Dispense: 6 tablet; Refill: 0  -     guaiFENesin-codeine 100-10 mg/5 ml (TUSSI-ORGANIDIN NR)  mg/5 mL syrup; Take 5 mLs by mouth every 6 (six) hours as needed for Cough. Do not drive or perform dangerous tasks while taking. Do not take if short of breath  Dispense: 120 mL; Refill: 0  -     benzonatate (TESSALON) 100 MG capsule; Take 1 capsule (100 mg total) by mouth 3 (three) times daily as needed for Cough.  Dispense: 30 capsule; Refill: 0  - Recommend 5 day quarantine from symptom onset / 24 hours from last fever (whichever is longer) or negative COVID screening test before resuming normal activities. Mask wearing x5 additional days past quarantine if positive. Present to ED if severely fatigued or respiratory distress develops   - Conservative care recommendations provided today  - Side effects of medication provided today and instructed patient to not drive or perform dangerous tasks while taking     RTC PRN

## 2023-04-03 ENCOUNTER — PATIENT MESSAGE (OUTPATIENT)
Dept: ADMINISTRATIVE | Facility: HOSPITAL | Age: 58
End: 2023-04-03
Payer: COMMERCIAL

## 2023-04-21 ENCOUNTER — PATIENT MESSAGE (OUTPATIENT)
Dept: ADMINISTRATIVE | Facility: HOSPITAL | Age: 58
End: 2023-04-21
Payer: COMMERCIAL

## 2023-10-19 ENCOUNTER — OFFICE VISIT (OUTPATIENT)
Dept: OBSTETRICS AND GYNECOLOGY | Facility: CLINIC | Age: 58
End: 2023-10-19
Attending: OBSTETRICS & GYNECOLOGY
Payer: COMMERCIAL

## 2023-10-19 VITALS
HEIGHT: 66 IN | BODY MASS INDEX: 30.01 KG/M2 | SYSTOLIC BLOOD PRESSURE: 106 MMHG | DIASTOLIC BLOOD PRESSURE: 78 MMHG | WEIGHT: 186.75 LBS

## 2023-10-19 DIAGNOSIS — Z12.4 SCREENING FOR CERVICAL CANCER: Primary | ICD-10-CM

## 2023-10-19 DIAGNOSIS — Z12.31 ENCOUNTER FOR SCREENING MAMMOGRAM FOR BREAST CANCER: ICD-10-CM

## 2023-10-19 DIAGNOSIS — Z11.51 SCREENING FOR HPV (HUMAN PAPILLOMAVIRUS): ICD-10-CM

## 2023-10-19 DIAGNOSIS — N64.52 NIPPLE DISCHARGE: ICD-10-CM

## 2023-10-19 PROCEDURE — 99386 PREV VISIT NEW AGE 40-64: CPT | Mod: S$GLB,,, | Performed by: OBSTETRICS & GYNECOLOGY

## 2023-10-19 PROCEDURE — 99999 PR PBB SHADOW E&M-EST. PATIENT-LVL III: CPT | Mod: PBBFAC,,, | Performed by: OBSTETRICS & GYNECOLOGY

## 2023-10-19 PROCEDURE — 88175 CYTOPATH C/V AUTO FLUID REDO: CPT | Performed by: OBSTETRICS & GYNECOLOGY

## 2023-10-19 PROCEDURE — 99386 PR PREVENTIVE VISIT,NEW,40-64: ICD-10-PCS | Mod: S$GLB,,, | Performed by: OBSTETRICS & GYNECOLOGY

## 2023-10-19 PROCEDURE — 99999 PR PBB SHADOW E&M-EST. PATIENT-LVL III: ICD-10-PCS | Mod: PBBFAC,,, | Performed by: OBSTETRICS & GYNECOLOGY

## 2023-10-19 PROCEDURE — 87624 HPV HI-RISK TYP POOLED RSLT: CPT | Performed by: OBSTETRICS & GYNECOLOGY

## 2023-10-19 NOTE — PROGRESS NOTES
Subjective:       Patient ID: Janae Tobias is a 58 y.o. female.    Chief Complaint:  Annual Exam        History of Present Illness  Janae Tobias is a 58 y.o. female No obstetric history on file. who presents for new patient annual exam. She also reports spontaneous brown discharge from the left breast, not today. Was on HRT in the past was taking Estrogen and testosterone and oral prometrium from a holistic oncology doctor. She stopped for fear of cancer. Felt great while she was on it and not does not. Dryness, no libido. I recommend eval of nipple discharge first and if normal then I will start Estradiol and Prometrium and refer to [facility] for testosterone. Pt agrees with plan      Brown discharge spontaneous from left breast, not today  Was on HRT and testosterone and felt great, off because of fear of cancer  Dryness, no libido  Rec- MMG and if normal start E+P and refer to [facility] for test  pap  No LMP recorded. Patient is postmenopausal.   Date of Last Pap: 10/20/2023    Review of Systems  Review of Systems     Objective:   Physical Exam:   Constitutional: She is oriented to person, place, and time. Vital signs are normal. She appears well-developed and well-nourished. No distress.        Pulmonary/Chest: She exhibits no mass. Right breast exhibits no mass, no nipple discharge, no skin change, no tenderness, no bleeding and no swelling. Left breast exhibits no mass, no nipple discharge, no skin change, no tenderness, no bleeding and no swelling. Breasts are symmetrical.        Abdominal: Soft. Bowel sounds are normal. She exhibits no distension and no mass. There is no abdominal tenderness. There is no rebound.     Genitourinary:    Vagina and uterus normal.   There is no rash, tenderness, lesion or injury on the right labia. There is no rash, tenderness, lesion or injury on the left labia. Cervix is normal. Right adnexum displays no mass, no tenderness and no fullness. Left adnexum displays no mass, no  tenderness and no fullness. No erythema,  no vaginal discharge, tenderness, rectocele, cystocele or unspecified prolapse of vaginal walls in the vagina. Cervix exhibits no motion tenderness, no discharge and no friability. Uterus is not deviated, not enlarged, not fixed, not tender and not hosting fibroids.           Musculoskeletal: Normal range of motion and moves all extremeties.      Lymphadenopathy:        Right: No supraclavicular adenopathy present.        Left: No supraclavicular adenopathy present.    Neurological: She is alert and oriented to person, place, and time.    Skin: Skin is warm and dry.    Psychiatric: She has a normal mood and affect. Her behavior is normal. Judgment normal.   Not able to express any discharge from nipple today     Assessment/ Plan:     1. Screening for cervical cancer  Liquid-Based Pap Smear, Screening      2. Screening for HPV (human papillomavirus)  HPV High Risk Genotypes, PCR      3. Encounter for screening mammogram for breast cancer  Mammo Digital Diagnostic with Contrast, Bilateral    US Breast Left Complete      4. Nipple discharge  Mammo Digital Diagnostic with Contrast, Bilateral    US Breast Left Complete          No follow-ups on file.    As of April 1, 2021, the Cures Act has been passed nationally. This new law requires that all doctors progress notes, lab results, pathology reports and radiology reports be released IMMEDIATELY to the patient in the patient portal. That means that the results are released to you at the EXACT same time they are released to me. Therefore, with all of the patients that I have I am not able to reply to each patient exactly when the results come in. So there will be a delay from when you see the results to when I see them and have time to come up with a response to send you. Also I only see these results when I am on the computer at work. So if the results come in over the weekend or after 5 pm of a work day, I will not see them until  the next business day. As you can tell, this is a challenge as a physician to give every patient the quick response they hope for and deserve. So please be patient! Thanks for understanding, Dr. Abernathy

## 2023-10-23 ENCOUNTER — TELEPHONE (OUTPATIENT)
Dept: OBSTETRICS AND GYNECOLOGY | Facility: CLINIC | Age: 58
End: 2023-10-23
Payer: COMMERCIAL

## 2023-10-23 DIAGNOSIS — Z12.31 ENCOUNTER FOR SCREENING MAMMOGRAM FOR BREAST CANCER: Primary | ICD-10-CM

## 2023-10-24 ENCOUNTER — TELEPHONE (OUTPATIENT)
Dept: RADIOLOGY | Facility: HOSPITAL | Age: 58
End: 2023-10-24
Payer: COMMERCIAL

## 2023-10-24 NOTE — TELEPHONE ENCOUNTER
----- Message from Sharonda Wilson sent at 10/24/2023  3:57 PM CDT -----  Contact: [phone]  NAV SINHA calling regarding Appointment Access  (message) Pt asking for a call back to help get schedule for her mammo and also US

## 2023-10-25 ENCOUNTER — TELEPHONE (OUTPATIENT)
Dept: RADIOLOGY | Facility: HOSPITAL | Age: 58
End: 2023-10-25
Payer: COMMERCIAL

## 2023-10-25 LAB
HPV HR 12 DNA SPEC QL NAA+PROBE: NEGATIVE
HPV16 AG SPEC QL: NEGATIVE
HPV18 DNA SPEC QL NAA+PROBE: NEGATIVE

## 2023-10-25 NOTE — TELEPHONE ENCOUNTER
----- Message from Ana Paula Rodriguez sent at 10/25/2023 10:03 AM CDT -----  Regarding: FW: Missed Call  Contact: [phone]    ----- Message -----  From: Riana Ferrara  Sent: 10/24/2023   4:25 PM CDT  To: #  Subject: Missed Call                                      Hi, pt just missed a call from the office and is asking for a call back. Pls call the pt at [phone] to k with the pt.

## 2023-10-26 LAB
FINAL PATHOLOGIC DIAGNOSIS: NORMAL
Lab: NORMAL

## 2023-11-07 ENCOUNTER — HOSPITAL ENCOUNTER (OUTPATIENT)
Dept: RADIOLOGY | Facility: HOSPITAL | Age: 58
Discharge: HOME | End: 2023-11-07
Attending: OBSTETRICS & GYNECOLOGY
Payer: COMMERCIAL

## 2023-11-07 VITALS — BODY MASS INDEX: 29.25 KG/M2 | WEIGHT: 182 LBS | HEIGHT: 66 IN

## 2023-11-07 DIAGNOSIS — N64.52 NIPPLE DISCHARGE: ICD-10-CM

## 2023-11-07 DIAGNOSIS — Z12.31 ENCOUNTER FOR SCREENING MAMMOGRAM FOR BREAST CANCER: ICD-10-CM

## 2023-11-07 PROCEDURE — 77062 MAMMO DIGITAL DIAGNOSTIC BILAT WITH TOMO: ICD-10-PCS | Mod: 26,,, | Performed by: RADIOLOGY

## 2023-11-07 PROCEDURE — 77066 MAMMO DIGITAL DIAGNOSTIC BILAT WITH TOMO: ICD-10-PCS | Mod: 26,,, | Performed by: RADIOLOGY

## 2023-11-07 PROCEDURE — 77066 DX MAMMO INCL CAD BI: CPT | Mod: 26,,, | Performed by: RADIOLOGY

## 2023-11-07 PROCEDURE — 77062 BREAST TOMOSYNTHESIS BI: CPT | Mod: 26,,, | Performed by: RADIOLOGY

## 2023-11-07 PROCEDURE — 77062 BREAST TOMOSYNTHESIS BI: CPT | Mod: TC

## 2023-11-07 PROCEDURE — 76642 ULTRASOUND BREAST LIMITED: CPT | Mod: 26,LT,, | Performed by: RADIOLOGY

## 2023-11-07 PROCEDURE — 76642 US BREAST LEFT LIMITED: ICD-10-PCS | Mod: 26,LT,, | Performed by: RADIOLOGY

## 2023-11-07 PROCEDURE — 76642 ULTRASOUND BREAST LIMITED: CPT | Mod: TC,LT

## 2023-11-14 ENCOUNTER — TELEPHONE (OUTPATIENT)
Dept: SURGERY | Facility: CLINIC | Age: 58
End: 2023-11-14
Payer: COMMERCIAL

## 2023-11-14 NOTE — TELEPHONE ENCOUNTER
----- Message from Stefan Stratton LPN sent at 11/14/2023  8:10 AM CST -----  Regarding: breast consult  Hello, pt needs breast consult for   Thank you,     Recommendation:  Clinical exam for correlation of finding is recommended.  This is recommended for evaluation of nipple discharge.  The patient will be contacted by the breast center to schedule appointment with breast surgery clinic. I discussed these findings and recommendations with the patient in detail at the time of exam.

## 2023-11-29 ENCOUNTER — OFFICE VISIT (OUTPATIENT)
Dept: SURGERY | Facility: CLINIC | Age: 58
End: 2023-11-29
Payer: COMMERCIAL

## 2023-11-29 VITALS
DIASTOLIC BLOOD PRESSURE: 63 MMHG | SYSTOLIC BLOOD PRESSURE: 133 MMHG | BODY MASS INDEX: 29.73 KG/M2 | WEIGHT: 185 LBS | HEIGHT: 66 IN | HEART RATE: 66 BPM

## 2023-11-29 DIAGNOSIS — N64.52 NIPPLE DISCHARGE: Primary | ICD-10-CM

## 2023-11-29 PROCEDURE — 99203 PR OFFICE/OUTPT VISIT, NEW, LEVL III, 30-44 MIN: ICD-10-PCS | Mod: S$GLB,,, | Performed by: SURGERY

## 2023-11-29 PROCEDURE — 99203 OFFICE O/P NEW LOW 30 MIN: CPT | Mod: S$GLB,,, | Performed by: SURGERY

## 2023-11-29 PROCEDURE — 99999 PR PBB SHADOW E&M-EST. PATIENT-LVL III: ICD-10-PCS | Mod: PBBFAC,,, | Performed by: SURGERY

## 2023-11-29 PROCEDURE — 99999 PR PBB SHADOW E&M-EST. PATIENT-LVL III: CPT | Mod: PBBFAC,,, | Performed by: SURGERY

## 2023-12-01 RX ORDER — LORAZEPAM 1 MG/1
1 TABLET ORAL SEE ADMIN INSTRUCTIONS
Qty: 1 TABLET | Refills: 0 | Status: SHIPPED | OUTPATIENT
Start: 2023-12-01 | End: 2024-03-06

## 2023-12-01 NOTE — PROGRESS NOTES
History and Physical  [facility]  Department of Surgery    REFERRING PROVIDER: Pierre Bender Md  [address]  [address] [place] [id number]    CHIEF COMPLAINT: left nipple discharge    Subjective:      Janae Tobias is a 58 y.o.  female referred for evaluation of discharge of the left breast.  She 1st nodes discharge in August.  It started as witness found on her shirt at night it progressed to having some dark brown discharge.  She then also solid a few times in her bra.  The last time she identified discharge was in September.  Prior to noting discharge she was having intense itching in her bilateral breasts but this has gone away without any treatment.  She underwent diagnostic breast imaging on 11/07/2021 including bilateral diagnostic mammogram and left ultrasound and findings were BI-RADS 1 negative.       Patient denies previous breast biopsy. Patient denies a personal history of breast cancer.  Family history of breast cancer in her paternal grandmother, maternal grandmother lung cancer, sister oral cancer.  Postmenopausal was taking hormone replacement therapy we will recently stopped.    Past Medical History:   Diagnosis Date    Hypothyroidism      Past Surgical History:   Procedure Laterality Date    TUBAL LIGATION      uterine ablation  2012     No current outpatient medications on file prior to visit.     No current facility-administered medications on file prior to visit.     Social History     Socioeconomic History    Marital status:    Tobacco Use    Smoking status: Never    Smokeless tobacco: Never   Substance and Sexual Activity    Alcohol use: Not Currently    Drug use: Never     Family History   Problem Relation Age of Onset    Breast cancer Paternal Grandmother        Review of Systems   All other systems reviewed and are negative.    Objective:   /63 (BP Location: Left arm, Patient Position: Sitting, BP Method: Large (Automatic))   Pulse 66   Ht  "5' 6" (1.676 m)   Wt 83.9 kg (185 lb)   BMI 29.86 kg/m²     Physical Exam   Vitals reviewed.  Constitutional: She is oriented to person, place, and time.   Cardiovascular:  Normal rate.            Pulmonary/Chest: Effort normal. Right breast exhibits no inverted nipple, no mass, no nipple discharge, no skin change and no tenderness. Left breast exhibits no inverted nipple, no mass, no nipple discharge, no skin change and no tenderness.   Abdominal: Normal appearance.   Musculoskeletal: Lymphadenopathy:      Cervical: No cervical adenopathy.      Upper Body:      Right upper body: No supraclavicular or axillary adenopathy.      Left upper body: No supraclavicular or axillary adenopathy.     Neurological: She is alert and oriented to person, place, and time.   Skin: Skin is warm and dry.     Psychiatric: Her behavior is normal. Mood normal.       Radiology review: Images personally reviewed by me in the clinic and shown to the patient during the consultation.       Assessment:      Janae Tobias is a 58 y.o. she is female with left nipple discharge.     Plan:   We discussed the prior left nipple discharge.  We discussed that discharge which is unilateral and spontaneous is more worrisome than if it was bilateral or compression.  The discharge started at as clear but also had some dark brown.  It is difficult to know if this was bloody nipple discharge.  Bloody discharge would be more worrisome.  However it is reassuring that she was not have discharge since September.  We discussed the nipple discharge and have me different causes.  The most common cause of bloody nipple discharge as the papilloma however cancer can also cause this type of discharge.  If the discharge was not bloody there are other causes such as duct dilation which could cause discharge.    Her mammogram and ultrasound were negative for any underlying areas of concern.  We discussed that the next best workup would be breast MRI.  She is " claustrophobic but thinks she may be able to tolerate with anxiolytics.   If the MRI is negative and she continues not have discharge then I would not recommend any surgical intervention.  However she was returned to nipple discharge we discussed the possible surgical options which include targeted or major duct excision.

## 2023-12-07 ENCOUNTER — HOSPITAL ENCOUNTER (OUTPATIENT)
Dept: RADIOLOGY | Facility: HOSPITAL | Age: 58
Discharge: HOME | End: 2023-12-07
Attending: SURGERY
Payer: COMMERCIAL

## 2023-12-07 DIAGNOSIS — N64.52 NIPPLE DISCHARGE: ICD-10-CM

## 2023-12-07 PROCEDURE — A9577 INJ MULTIHANCE: HCPCS | Performed by: SURGERY

## 2023-12-07 PROCEDURE — 25500020 PHARM REV CODE 255: Performed by: SURGERY

## 2023-12-07 PROCEDURE — 77049 MRI BREAST W/WO CONTRAST, W/CAD, BILATERAL: ICD-10-PCS | Mod: 26,,, | Performed by: RADIOLOGY

## 2023-12-07 PROCEDURE — 77049 MRI BREAST C-+ W/CAD BI: CPT | Mod: 26,,, | Performed by: RADIOLOGY

## 2023-12-07 PROCEDURE — 77049 MRI BREAST C-+ W/CAD BI: CPT | Mod: TC

## 2023-12-07 RX ADMIN — GADOBENATE DIMEGLUMINE 18 ML: 529 INJECTION, SOLUTION INTRAVENOUS at 06:12

## 2023-12-12 ENCOUNTER — OFFICE VISIT (OUTPATIENT)
Dept: SURGERY | Facility: CLINIC | Age: 58
End: 2023-12-12
Payer: COMMERCIAL

## 2023-12-12 VITALS
HEART RATE: 73 BPM | HEIGHT: 66 IN | WEIGHT: 185 LBS | DIASTOLIC BLOOD PRESSURE: 61 MMHG | BODY MASS INDEX: 29.73 KG/M2 | SYSTOLIC BLOOD PRESSURE: 124 MMHG

## 2023-12-12 DIAGNOSIS — N64.52 NIPPLE DISCHARGE: Primary | ICD-10-CM

## 2023-12-12 PROCEDURE — 99213 PR OFFICE/OUTPT VISIT, EST, LEVL III, 20-29 MIN: ICD-10-PCS | Mod: S$GLB,,, | Performed by: SURGERY

## 2023-12-12 PROCEDURE — 99213 OFFICE O/P EST LOW 20 MIN: CPT | Mod: S$GLB,,, | Performed by: SURGERY

## 2023-12-12 NOTE — PROGRESS NOTES
[facility]  Department of Surgery    REFERRING PROVIDER: No referring provider defined for this encounter.    CHIEF COMPLAINT: left nipple discharge    Subjective:      Janae Toibas is a 58 y.o.  female referred for evaluation of discharge of the left breast.  She 1st nodes discharge in August.  It started as witness found on her shirt at night it progressed to having some dark brown discharge.  She then also solid a few times in her bra.  The last time she identified discharge was in September.  Prior to noting discharge she was having intense itching in her bilateral breasts but this has gone away without any treatment.  She underwent diagnostic breast imaging on 11/07/2021 including bilateral diagnostic mammogram and left ultrasound and findings were BI-RADS 1 negative.     Patient denies previous breast biopsy. Patient denies a personal history of breast cancer.  Family history of breast cancer in her paternal grandmother, maternal grandmother lung cancer, sister oral cancer.  Postmenopausal was taking hormone replacement therapy we will recently stopped.    Interval History 12/12/2023:   Patient states she has not had any further nipple discharge since September. She denies any masses or breast abnormalities.     Past Medical History:   Diagnosis Date    Hypothyroidism      Past Surgical History:   Procedure Laterality Date    TUBAL LIGATION      uterine ablation  2012     Current Outpatient Medications on File Prior to Visit   Medication Sig Dispense Refill    LORazepam (ATIVAN) 1 MG tablet Take 1 tablet (1 mg total) by mouth As instructed for Anxiety. Take 1 hour prior to the MRI. Do not drive when taking this medication. Do not combine with other benzodiazepines or narcotics. 1 tablet 0     No current facility-administered medications on file prior to visit.     Social History     Socioeconomic History    Marital status:    Tobacco Use    Smoking status: Never    Smokeless tobacco: Never    Substance and Sexual Activity    Alcohol use: Not Currently    Drug use: Never     Family History   Problem Relation Age of Onset    Breast cancer Paternal Grandmother        Review of Systems   All other systems reviewed and are negative.    Objective:   There were no vitals taken for this visit.    Physical Exam   Vitals reviewed.  Constitutional: She is oriented to person, place, and time.   Cardiovascular:  Normal rate.            Pulmonary/Chest: Effort normal. Right breast exhibits no inverted nipple, no mass, no nipple discharge, no skin change and no tenderness. Left breast exhibits no inverted nipple, no mass, no nipple discharge, no skin change and no tenderness.   Abdominal: Normal appearance.   Musculoskeletal: Lymphadenopathy:      Cervical: No cervical adenopathy.      Upper Body:      Right upper body: No supraclavicular or axillary adenopathy.      Left upper body: No supraclavicular or axillary adenopathy.     Neurological: She is alert and oriented to person, place, and time.   Skin: Skin is warm and dry.     Psychiatric: Her behavior is normal. Mood normal.       Radiology review: Images personally reviewed by me in the clinic and shown to the patient during the consultation.     Breast MRI 12/7/2023  Result:   Bilateral Breast MRI without and with IV Contrast     History:  Spontaneous left nipple discharge. Negative mammogram and ultrasound.      Films Compared:  Prior images (if available) were compared.     Technique:  Multisequence axial MR images without and with IV contrast.  Contrast: 18 mL Multihance.     Findings:  Scattered fibroglandular tissue. Minimal background parenchymal enhancement.     In the left breast central region anterior depth, there is a single T1 hyperintense duct, indicating it contains proteinaceous or hemorrhagic contents. This duct is not dilated, measuring 0.2 cm in diameter. No associated abnormal enhancement. This duct likely corresponds to her spontaneous  nipple discharge, but there is no suspicious MRI feature for malignancy.      No abnormal enhancement or finding suspicious for malignancy in either breast. No abnormal skin or nipple enhancement.      No axillary or internal mammary adenopathy.      Impression:  No suspicious finding in either breast.     Single non-dilated duct in the left breast probably corresponds to her spontaneous nipple discharge. No associated abnormal MRI enhancement. BI-RADS 2: Benign. She is already under breast surgery care.      BI-RADS Category:   Overall: 2 - Benign     Recommendation:  Annual screening mammogram schedule, next due Nov 2024.  She is already under Breast Surgical care.      Assessment:      Janae Tobias is a 58 y.o. she is female with previous left nipple discharge. MRI without evidence of abnormality or masses. Unable to express any discharge at bedside today.      Plan:     - Return to clinic as needed or if nipple discharge returns   - Defer further imaging at this time  - If discharge returns, could consider repeat MRI or major duct excision   - Continue with normal screening mammograms

## 2024-02-04 ENCOUNTER — PATIENT MESSAGE (OUTPATIENT)
Dept: OBSTETRICS AND GYNECOLOGY | Facility: CLINIC | Age: 59
End: 2024-02-04
Payer: COMMERCIAL

## 2024-02-05 NOTE — TELEPHONE ENCOUNTER
If she wants to just do the Estrogen and progesterone I can prescribe that. If she wants to do testosterone injections or pellets then refer to Women's wellness. If with me then schedule a virtual visit.

## 2024-03-06 ENCOUNTER — OFFICE VISIT (OUTPATIENT)
Dept: PRIMARY CARE CLINIC | Facility: CLINIC | Age: 59
End: 2024-03-06
Payer: COMMERCIAL

## 2024-03-06 DIAGNOSIS — N30.00 ACUTE CYSTITIS WITHOUT HEMATURIA: Primary | ICD-10-CM

## 2024-03-06 DIAGNOSIS — E03.9 HYPOTHYROIDISM, UNSPECIFIED TYPE: ICD-10-CM

## 2024-03-06 PROCEDURE — 99213 OFFICE O/P EST LOW 20 MIN: CPT | Mod: 95,,, | Performed by: FAMILY MEDICINE

## 2024-03-06 RX ORDER — NITROFURANTOIN 25; 75 MG/1; MG/1
100 CAPSULE ORAL 2 TIMES DAILY
Qty: 14 CAPSULE | Refills: 0 | Status: SHIPPED | OUTPATIENT
Start: 2024-03-06

## 2024-03-06 NOTE — PROGRESS NOTES
Subjective:       Patient ID: Janae Tobias is a 58 y.o. female.    Chief Complaint: No chief complaint on file.    HPI: 59 yo WF--thinks has a bladder infection--symptoms started yesterday---+frequency--+burning-dysuria--+ retention---+urgency---+ mild pyuria--no hematuria no odor  History UTI in the past last 1 over 1-2 years ago.No LMP age 50--last PAP September 2023 mammogram September 2023 MRI of the breasts with contrast November 2023--was OK   Patient had an ultrasound and mammogram for nipple discharge MRI showed no dilatation of the ducts so no invasive procedure was done at this time      ROS:  Skin: no psoriasis, eczema, skin cancer  HEENT: No headache, ocular pain, blurred vision, diplopia, epistaxis, hoarseness change in voice, thyroid --was on armour thyroid off meds x years   Lung: No pneumonia, asthma, Tb, wheezing, SOB,no smoking   Heart: No chest pain, ankle edema, palpitations, MI, boubacar murmur, hypertension, hyperlipidemia  Abdomen: No nausea, vomiting, diarrhea, constipation, ulcers, hepatitis, gallbladder disease, melena, hematochezia, hematemesis  : no UTI, renal disease, history nephrolithiasis x5 see3 HPI UTI   GYN uterine ablation--BTL--last Pap smear about 5 year--mammogram--5 years  MS: no fractures, O/A, lupus, rheumatoid, gout  Neuro: No dizziness, LOC, seizures   No diabetes, no anemia, no anxiety, no depression    4 children work out pt therapy lives with      Objective:   Physical Exam:  General: Well nourished, well developed, no acute distress +overweight  Skin: No lesions  HEENT: Eyes PERRLA, EOM intact, nose patent, throat non-erythematous ears TMs clear  NECK: Supple, no bruits, No JVD, no nodes  Lungs: Clear, no rales, rhonchi, wheezing  Heart: Regular rate and rhythm, no murmurs, gallops, or rubs  Abdomen: flat, bowel sounds positive, no tenderness, or organomegaly  MS: Range of motion and muscle strength intact slight swelling of the left 3rd PIP joint  probable osteoarthritis  Neuro: Alert, CN intact, oriented X 3  Extremities: No cyanosis, clubbing, or edema         Assessment:       1. Hypothyroidism, unspecified type    2. Acute cystitis without hematuria          Plan:       Hypothyroidism, unspecified type  -     CBC Auto Differential; Future; Expected date: 03/06/2024  -     Comprehensive Metabolic Panel; Future; Expected date: 03/06/2024  -     Hemoglobin A1C; Future; Expected date: 03/06/2024  -     Lipid Panel; Future; Expected date: 03/06/2024  -     TSH; Future; Expected date: 03/06/2024  -     T4, Free; Future; Expected date: 03/06/2024    Acute cystitis without hematuria    Other orders  -     nitrofurantoin, macrocrystal-monohydrate, (MACROBID) 100 MG capsule; Take 1 capsule (100 mg total) by mouth 2 (two) times daily.  Dispense: 14 capsule; Refill: 0          Main reason for visit  UTI--Macrobid 100 mg 1 p.o. b.i.d. x7 days patient has peridium 1 p.o. t.i.d. x5 days  Multiple endocrine issues--history hypothyroidism/low testosterone/low estrogen/low progesterone/DHEA/Iodine --was on hormone estrogen progesterone and testosterone for awhile now with some breasts issues told should avoid hormones if has any type of breast tissue but appears to be more ductal--has dyspareunia told should talk to OBGYN if breast end up normal that she could try estrogen vaginal cream  Lab CBCs CMP lipids hemoglobin A1c T4 TSH  Overweight == exercise try to get to ideal body weight  History to nephrolithiasis no recent problems had x5  Hypothyroidism was on [place] thyroid now on no thyroid

## 2024-03-13 ENCOUNTER — HOSPITAL ENCOUNTER (OUTPATIENT)
Facility: HOSPITAL | Age: 59
Discharge: HOME | End: 2024-03-14
Attending: STUDENT IN AN ORGANIZED HEALTH CARE EDUCATION/TRAINING PROGRAM | Admitting: STUDENT IN AN ORGANIZED HEALTH CARE EDUCATION/TRAINING PROGRAM
Payer: COMMERCIAL

## 2024-03-13 DIAGNOSIS — R03.0 ELEVATED BLOOD PRESSURE READING WITHOUT DIAGNOSIS OF HYPERTENSION: ICD-10-CM

## 2024-03-13 DIAGNOSIS — R07.89 CHEST DISCOMFORT: ICD-10-CM

## 2024-03-13 DIAGNOSIS — R07.9 CHEST PAIN: Primary | ICD-10-CM

## 2024-03-13 LAB
ALBUMIN SERPL BCP-MCNC: 4 G/DL (ref 3.5–5.2)
ALP SERPL-CCNC: 80 U/L (ref 55–135)
ALT SERPL W/O P-5'-P-CCNC: 15 U/L (ref 10–44)
ANION GAP SERPL CALC-SCNC: 9 MMOL/L (ref 8–16)
AST SERPL-CCNC: 18 U/L (ref 10–40)
BASOPHILS # BLD AUTO: 0.07 K/UL (ref 0–0.2)
BASOPHILS NFR BLD: 1.4 % (ref 0–1.9)
BILIRUB SERPL-MCNC: 0.3 MG/DL (ref 0.1–1)
BNP SERPL-MCNC: <10 PG/ML (ref 0–99)
BUN SERPL-MCNC: 9 MG/DL (ref 6–20)
CALCIUM SERPL-MCNC: 9.8 MG/DL (ref 8.7–10.5)
CHLORIDE SERPL-SCNC: 107 MMOL/L (ref 95–110)
CO2 SERPL-SCNC: 24 MMOL/L (ref 23–29)
CREAT SERPL-MCNC: 0.7 MG/DL (ref 0.5–1.4)
D DIMER PPP IA.FEU-MCNC: <0.19 MG/L FEU
DIFFERENTIAL METHOD BLD: ABNORMAL
EOSINOPHIL # BLD AUTO: 0.1 K/UL (ref 0–0.5)
EOSINOPHIL NFR BLD: 1.8 % (ref 0–8)
ERYTHROCYTE [DISTWIDTH] IN BLOOD BY AUTOMATED COUNT: 12.7 % (ref 11.5–14.5)
EST. GFR  (NO RACE VARIABLE): >60 ML/MIN/1.73 M^2
GLUCOSE SERPL-MCNC: 106 MG/DL (ref 70–110)
HCT VFR BLD AUTO: 41.5 % (ref 37–48.5)
HCV AB SERPL QL IA: NORMAL
HGB BLD-MCNC: 13.5 G/DL (ref 12–16)
HIV 1+2 AB+HIV1 P24 AG SERPL QL IA: NORMAL
IMM GRANULOCYTES # BLD AUTO: 0.01 K/UL (ref 0–0.04)
IMM GRANULOCYTES NFR BLD AUTO: 0.2 % (ref 0–0.5)
LIPASE SERPL-CCNC: 21 U/L (ref 4–60)
LYMPHOCYTES # BLD AUTO: 0.9 K/UL (ref 1–4.8)
LYMPHOCYTES NFR BLD: 16.7 % (ref 18–48)
MCH RBC QN AUTO: 28.4 PG (ref 27–31)
MCHC RBC AUTO-ENTMCNC: 32.5 G/DL (ref 32–36)
MCV RBC AUTO: 87 FL (ref 82–98)
MONOCYTES # BLD AUTO: 0.3 K/UL (ref 0.3–1)
MONOCYTES NFR BLD: 6.5 % (ref 4–15)
NEUTROPHILS # BLD AUTO: 3.7 K/UL (ref 1.8–7.7)
NEUTROPHILS NFR BLD: 73.4 % (ref 38–73)
NRBC BLD-RTO: 0 /100 WBC
OHS QRS DURATION: 68 MS
OHS QTC CALCULATION: 433 MS
PLATELET # BLD AUTO: 305 K/UL (ref 150–450)
PMV BLD AUTO: 9.6 FL (ref 9.2–12.9)
POTASSIUM SERPL-SCNC: 4.4 MMOL/L (ref 3.5–5.1)
PROT SERPL-MCNC: 7.8 G/DL (ref 6–8.4)
RBC # BLD AUTO: 4.76 M/UL (ref 4–5.4)
SODIUM SERPL-SCNC: 140 MMOL/L (ref 136–145)
TROPONIN I SERPL DL<=0.01 NG/ML-MCNC: <0.006 NG/ML (ref 0–0.03)
WBC # BLD AUTO: 5.08 K/UL (ref 3.9–12.7)

## 2024-03-13 PROCEDURE — 80053 COMPREHEN METABOLIC PANEL: CPT | Performed by: PHYSICIAN ASSISTANT

## 2024-03-13 PROCEDURE — 83880 ASSAY OF NATRIURETIC PEPTIDE: CPT | Performed by: PHYSICIAN ASSISTANT

## 2024-03-13 PROCEDURE — 84484 ASSAY OF TROPONIN QUANT: CPT | Performed by: PHYSICIAN ASSISTANT

## 2024-03-13 PROCEDURE — 99285 EMERGENCY DEPT VISIT HI MDM: CPT | Mod: 25

## 2024-03-13 PROCEDURE — 83690 ASSAY OF LIPASE: CPT | Performed by: PHYSICIAN ASSISTANT

## 2024-03-13 PROCEDURE — 87389 HIV-1 AG W/HIV-1&-2 AB AG IA: CPT | Performed by: PHYSICIAN ASSISTANT

## 2024-03-13 PROCEDURE — 86803 HEPATITIS C AB TEST: CPT | Performed by: PHYSICIAN ASSISTANT

## 2024-03-13 PROCEDURE — 25000003 PHARM REV CODE 250: Performed by: NURSE PRACTITIONER

## 2024-03-13 PROCEDURE — 25000003 PHARM REV CODE 250: Performed by: PHYSICIAN ASSISTANT

## 2024-03-13 PROCEDURE — 93005 ELECTROCARDIOGRAM TRACING: CPT

## 2024-03-13 PROCEDURE — 85379 FIBRIN DEGRADATION QUANT: CPT | Performed by: PHYSICIAN ASSISTANT

## 2024-03-13 PROCEDURE — 85025 COMPLETE CBC W/AUTO DIFF WBC: CPT | Performed by: PHYSICIAN ASSISTANT

## 2024-03-13 PROCEDURE — 93010 ELECTROCARDIOGRAM REPORT: CPT | Mod: ,,, | Performed by: INTERNAL MEDICINE

## 2024-03-13 PROCEDURE — G0378 HOSPITAL OBSERVATION PER HR: HCPCS

## 2024-03-13 PROCEDURE — 84484 ASSAY OF TROPONIN QUANT: CPT | Mod: 91 | Performed by: NURSE PRACTITIONER

## 2024-03-13 RX ORDER — ASPIRIN 325 MG
325 TABLET ORAL
Status: COMPLETED | OUTPATIENT
Start: 2024-03-13 | End: 2024-03-13

## 2024-03-13 RX ORDER — SODIUM CHLORIDE 0.9 % (FLUSH) 0.9 %
10 SYRINGE (ML) INJECTION
Status: DISCONTINUED | OUTPATIENT
Start: 2024-03-13 | End: 2024-03-14 | Stop reason: HOSPADM

## 2024-03-13 RX ORDER — NITROGLYCERIN 0.4 MG/1
0.4 TABLET SUBLINGUAL EVERY 5 MIN PRN
Status: DISCONTINUED | OUTPATIENT
Start: 2024-03-13 | End: 2024-03-14 | Stop reason: HOSPADM

## 2024-03-13 RX ORDER — ONDANSETRON HYDROCHLORIDE 2 MG/ML
4 INJECTION, SOLUTION INTRAVENOUS EVERY 6 HOURS PRN
Status: DISCONTINUED | OUTPATIENT
Start: 2024-03-13 | End: 2024-03-14 | Stop reason: HOSPADM

## 2024-03-13 RX ORDER — NITROFURANTOIN 25; 75 MG/1; MG/1
100 CAPSULE ORAL 2 TIMES DAILY
Status: DISCONTINUED | OUTPATIENT
Start: 2024-03-13 | End: 2024-03-14 | Stop reason: HOSPADM

## 2024-03-13 RX ORDER — ACETAMINOPHEN 325 MG/1
650 TABLET ORAL EVERY 6 HOURS PRN
Status: DISCONTINUED | OUTPATIENT
Start: 2024-03-13 | End: 2024-03-14 | Stop reason: HOSPADM

## 2024-03-13 RX ADMIN — ASPIRIN 325 MG ORAL TABLET 325 MG: 325 PILL ORAL at 10:03

## 2024-03-13 RX ADMIN — NITROFURANTOIN MONOHYDRATE/MACROCRYSTALS 100 MG: 25; 75 CAPSULE ORAL at 09:03

## 2024-03-13 NOTE — H&P
ED Observation Unit  History and Physical      I assumed care of this patient from the Main ED at onset of observation time, 1151 on 03/13/2024.       History of Present Illness:    The patient is a 58-year-old female. She has a past medical history of hypothyroidism and abnormal nipple discharge of left breast. She presents to the ER for an emergent evaluation due to chest pain. She denies any known cardiac history. She states that heart disease does run in her family. 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 She states that this morning while driving to work she experienced a sudden onset of a sharp pain to her left chest. She states that the pain did not radiate anywhere. She states the pain lasted a few seconds then completely went away. She states that she has continued to experience multiple episodes exactly like this 1 since onset. She denies any triggering or inciting factors. She denies any shortness of breath, palpitations, dizziness, nausea, diaphoresis. She states the pain is not worse with a deep breath or to palpation of her chest. She denies any increased pain with movement or position. She denies any history of PE or DVT. She denies any calf pain. She states that she has been under increased stress recently due to death of a family member. She states that she does feel anxious, but notes that her anxiety did not start until after she experienced the chest discomfort. No additional complaints or concerns reported. No pre arrival treatment.     I reviewed the ED Provider Note dated 3/13/24 prior to my evaluation of this patient.  I reviewed all labs and imaging performed in the Main ED, prior to patient being placed in Observation. Patient was placed in the ED Observation Unit for Chest Pain.    PMHx   Past Medical History:   Diagnosis Date    Hypothyroidism       Past Surgical History:   Procedure Laterality Date    TUBAL LIGATION      uterine ablation  2012        Family Hx   Family History  "  Problem Relation Age of Onset    Heart disease Mother     Heart disease Father     Breast cancer Paternal Grandmother         Social Hx   Social History     Socioeconomic History    Marital status:    Tobacco Use    Smoking status: Never    Smokeless tobacco: Never   Substance and Sexual Activity    Alcohol use: Not Currently    Drug use: Never        Vital Signs   Vitals:    03/13/24 0914 03/13/24 1038 03/13/24 1153   BP: (!) 185/89 139/86 139/84   Pulse: 80 67 67   Resp: 18  16   Temp: 98.1 °F (36.7 °C) 98.2 °F (36.8 °C) 97.9 °F (36.6 °C)   TempSrc: Oral Oral Oral   SpO2: 96% 96% 97%   Weight: 83.9 kg (185 lb)     Height: 5' 6" (1.676 m)          Review of Systems  Per HPI.    Physical Exam  Nursing note and vitals reviewed.  Constitutional: She appears well-developed and well-nourished. She is not diaphoretic. No distress.   HENT:   Head: Normocephalic.   Eyes: Conjunctivae are normal.   Neck: Neck supple. No JVD present.   Cardiovascular:  Normal rate, regular rhythm and intact distal pulses.           Equal radial pulses bilaterally   Pulmonary/Chest: No respiratory distress. She has no wheezes.   No wheezing or coughing.  Lungs are clear to auscultation bilaterally.  Patient denies any pain with deep breath.   Abdominal: Abdomen is soft. She exhibits no distension. There is no abdominal tenderness.   Abdomen is soft, nontender, nondistended.  Benign abdomen. There is no rebound and no guarding.   Musculoskeletal:         General: Normal range of motion.      Cervical back: Neck supple.      Comments: There is trace pretibial edema bilaterally.  No calf pain or swelling appreciated.      Neurological: She is alert and oriented to person, place, and time. She has normal strength. No sensory deficit.   Skin: Skin is warm and dry. No rash noted.   No rash or shingles   Psychiatric: She has a normal mood and affect. Her behavior is normal.     Medications:   Scheduled Meds:  Continuous Infusions:  PRN " Meds:.      Assessment/Plan:  Chest Pain  -Heart Score 2, family Hx of CAD. Elevated BP noted. No hx of HTN.  -ED EKG shows normal rate, no ischemia. Chest x ray unremarkable. Initial labs unremarkable   -negative troponin (will trend), negative BNP, negative D-dimer  -place in EDOU for serial troponin levels, cardiac monitoring, stress/echo   -stress echo scheduled for 2:30pm tomorrow. Will place NPO 2 hours prior.    UTI  -currently being treated with Macrobid. Has 3 doses left. Will continue while admitted to EDOU.    Case was discussed with the ED provider Galdino Pennington PA-C.

## 2024-03-13 NOTE — PROGRESS NOTES
ED Observation Unit  Progress Note      HPI   The patient is a 58-year-old female. She has a past medical history of hypothyroidism and abnormal nipple discharge of left breast. She presents to the ER for an emergent evaluation due to chest pain. She denies any known cardiac history. She states that heart disease does run in her family. 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 She states that this morning while driving to work she experienced a sudden onset of a sharp pain to her left chest. She states that the pain did not radiate anywhere. She states the pain lasted a few seconds then completely went away. She states that she has continued to experience multiple episodes exactly like this one since onset. She denies any triggering or inciting factors. She denies any shortness of breath, palpitations, dizziness, nausea, diaphoresis. She states the pain is not worse with a deep breath or to palpation of her chest. She denies any increased pain with movement or position. She denies any history of PE or DVT. She denies any calf pain. She states that she has been under increased stress recently due to death of a family member. She states that she does feel anxious, but notes that her anxiety did not start until after she experienced the chest discomfort. No additional complaints or concerns reported. No pre arrival treatment.      Interval History   On my examination, patient reports feeling well and at her baseline.  No active chest pain.    PMHx   Past Medical History:   Diagnosis Date    Hypothyroidism       Past Surgical History:   Procedure Laterality Date    TUBAL LIGATION      uterine ablation  2012        Family Hx   Family History   Problem Relation Age of Onset    Heart disease Mother     Heart disease Father     Breast cancer Paternal Grandmother         Social Hx   Social History     Socioeconomic History    Marital status:    Tobacco Use    Smoking status: Never    Smokeless tobacco: Never    Substance and Sexual Activity    Alcohol use: Not Currently    Drug use: Never        Vital Signs   Vitals:    03/13/24 1153 03/13/24 1703 03/13/24 1925 03/13/24 2016   BP: 139/84 136/82 118/68 122/72   BP Location:  Right arm     Patient Position:  Lying     Pulse: 67 70 66 70   Resp: 16 18     Temp: 97.9 °F (36.6 °C) 98 °F (36.7 °C) 98.1 °F (36.7 °C) 98.6 °F (37 °C)   TempSrc: Oral Oral Oral Oral   SpO2: 97% 98% 97% 97%   Weight:       Height:            Review of Systems  Review of Systems   Cardiovascular:  Positive for chest pain (resolved).       Brief Physical Exam/Reassessment   Physical Exam  Vitals and nursing note reviewed.   Constitutional:       General: She is not in acute distress.     Appearance: She is well-developed and normal weight. She is not ill-appearing, toxic-appearing or diaphoretic.   HENT:      Head: Normocephalic and atraumatic.      Right Ear: External ear normal.      Left Ear: External ear normal.      Mouth/Throat:      Mouth: Mucous membranes are moist.      Pharynx: Oropharynx is clear.   Eyes:      Extraocular Movements: Extraocular movements intact.      Pupils: Pupils are equal, round, and reactive to light.   Cardiovascular:      Rate and Rhythm: Normal rate and regular rhythm.   Pulmonary:      Effort: Pulmonary effort is normal.      Breath sounds: Normal breath sounds.   Abdominal:      General: Bowel sounds are normal.      Palpations: Abdomen is soft.      Tenderness: There is no abdominal tenderness.   Musculoskeletal:         General: Normal range of motion.      Cervical back: Normal range of motion and neck supple.      Right lower leg: No edema.      Left lower leg: No edema.   Skin:     General: Skin is warm and dry.      Capillary Refill: Capillary refill takes less than 2 seconds.   Neurological:      General: No focal deficit present.      Mental Status: She is alert and oriented to person, place, and time.   Psychiatric:         Mood and Affect: Mood normal.  Mood is not anxious.         Behavior: Behavior normal. Behavior is not agitated.         Thought Content: Thought content normal.         Judgment: Judgment normal.         Labs/Imaging   Labs Reviewed   CBC W/ AUTO DIFFERENTIAL - Abnormal; Notable for the following components:       Result Value    Lymph # 0.9 (*)     Gran % 73.4 (*)     Lymph % 16.7 (*)     All other components within normal limits   HIV 1 / 2 ANTIBODY    Narrative:     Release to patient->Immediate   HEPATITIS C ANTIBODY    Narrative:     Release to patient->Immediate   COMPREHENSIVE METABOLIC PANEL   LIPASE   TROPONIN I   B-TYPE NATRIURETIC PEPTIDE   D DIMER, QUANTITATIVE   TROPONIN I   TROPONIN I      Imaging Results              X-Ray Chest PA And Lateral (Final result)  Result time 03/13/24 10:40:40      Final result by Parul Palomares MD (03/13/24 10:40:40)                   Impression:      No acute cardiopulmonary process seen.      Electronically signed by: Parul Palomares  Date:    03/13/2024  Time:    10:40               Narrative:    EXAMINATION:  XR CHEST PA AND LATERAL    CLINICAL HISTORY:  Chest pain, unspecified    TECHNIQUE:  PA and lateral views of the chest were performed.    COMPARISON:  None    FINDINGS:  Lungs are well expanded.  No acute consolidation, pleural effusion, or pneumothorax seen.    Cardiac silhouette is normal in size.                                       I reviewed all labs, imaging, EKGs.     Plan   Chest Pain  -Heart Score 2, family Hx of CAD. Elevated BP noted. No hx of HTN.  -ED EKG shows normal rate, no ischemia. Chest x ray unremarkable. Initial labs unremarkable   -troponin negative x3, negative BNP, negative D-dimer  -place in ED[facility] for serial troponin levels, cardiac monitoring, stress/echo   -stress echo scheduled for 2:30pm tomorrow. NPO after breakfast.      UTI  -currently being treated with Macrobid. Has 3 doses left. Will continue while admitted to EDOU.    I have discussed this case with  MACKENZIE Allred.

## 2024-03-13 NOTE — ED PROVIDER NOTES
Encounter Date: 3/13/2024       History     Chief Complaint   Patient presents with    Chest Pain     Denies cardiac hx     The patient is a 58-year-old female.  She has a past medical history of hypothyroidism and abnormal nipple discharge of left breast.  She presents to the ER for an emergent evaluation due to chest pain.  She denies any known cardiac history.  She states that heart disease does run in her family.  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  She states that this morning while driving to work she experienced a sudden onset of a sharp pain to her left chest.  She states that the pain did not radiate anywhere.  She states the pain lasted a few seconds then completely went away.  She states that she has continued to experience multiple episodes exactly like this 1 since onset.  She denies any triggering or inciting factors.  She denies any shortness of breath, palpitations, dizziness, nausea, diaphoresis.  She states the pain is not worse with a deep breath or to palpation of her chest.  She denies any increased pain with movement or position.  She denies any history of PE or DVT.  She denies any calf pain.  She states that she has been under increased stress recently due to death of a family member.  She states that she does feel anxious, but notes that her anxiety did not start until after she experienced the chest discomfort.  No additional complaints or concerns reported.  No pre arrival treatment.      Review of patient's allergies indicates:   Allergen Reactions    Cephalexin Hives     Past Medical History:   Diagnosis Date    Hypothyroidism      Past Surgical History:   Procedure Laterality Date    TUBAL LIGATION      uterine ablation  2012     Family History   Problem Relation Age of Onset    Heart disease Mother     Heart disease Father     Breast cancer Paternal Grandmother      Social History     Tobacco Use    Smoking status: Never    Smokeless tobacco: Never   Substance Use  Topics    Alcohol use: Not Currently    Drug use: Never     Review of Systems   Constitutional:  Negative for activity change, appetite change, chills, diaphoresis, fatigue and fever.   HENT:  Negative for congestion, rhinorrhea and sore throat.    Eyes:  Negative for pain and visual disturbance.   Respiratory:  Negative for shortness of breath.    Cardiovascular:  Positive for chest pain. Negative for palpitations and leg swelling.   Gastrointestinal:  Negative for abdominal pain, diarrhea, nausea and vomiting.   Genitourinary:  Negative for decreased urine volume and dysuria.   Musculoskeletal:  Negative for back pain, gait problem, myalgias and neck pain.   Skin:  Negative for color change and rash.   Allergic/Immunologic: Negative for immunocompromised state.   Neurological:  Negative for dizziness, syncope, weakness, light-headedness, numbness and headaches.   Psychiatric/Behavioral:  Negative for confusion.        Physical Exam     Initial Vitals [03/13/24 0914]   BP Pulse Resp Temp SpO2   (!) 185/89 80 18 98.1 °F (36.7 °C) 96 %      MAP       --         Physical Exam    Nursing note and vitals reviewed.  Constitutional: She appears well-developed and well-nourished. She is not diaphoretic. No distress.   HENT:   Head: Normocephalic.   Eyes: Conjunctivae are normal.   Neck: Neck supple. No JVD present.   Cardiovascular:  Normal rate, regular rhythm and intact distal pulses.           Equal radial pulses bilaterally   Pulmonary/Chest: No respiratory distress. She has no wheezes.   No wheezing or coughing.  Lungs are clear to auscultation bilaterally.  Patient denies any pain with deep breath.   Abdominal: Abdomen is soft. She exhibits no distension. There is no abdominal tenderness.   Abdomen is soft, nontender, nondistended.  Benign abdomen. There is no rebound and no guarding.   Musculoskeletal:         General: Normal range of motion.      Cervical back: Neck supple.      Comments: There is trace pretibial  edema bilaterally.  No calf pain or swelling appreciated.     Neurological: She is alert and oriented to person, place, and time. She has normal strength. No sensory deficit.   Skin: Skin is warm and dry. No rash noted.   No rash or shingles   Psychiatric: She has a normal mood and affect. Her behavior is normal.         ED Course   Procedures  Labs Reviewed   CBC W/ AUTO DIFFERENTIAL - Abnormal; Notable for the following components:       Result Value    Lymph # 0.9 (*)     Gran % 73.4 (*)     Lymph % 16.7 (*)     All other components within normal limits   HIV 1 / 2 ANTIBODY    Narrative:     Release to patient->Immediate   HEPATITIS C ANTIBODY    Narrative:     Release to patient->Immediate   COMPREHENSIVE METABOLIC PANEL   LIPASE   TROPONIN I   B-TYPE NATRIURETIC PEPTIDE   D DIMER, QUANTITATIVE   TROPONIN I     Results for orders placed or performed during the hospital encounter of 03/13/24   HIV 1/2 Ag/Ab (4th Gen)   Result Value Ref Range    HIV 1/2 Ag/Ab Non-reactive Non-reactive   Hepatitis C Antibody   Result Value Ref Range    Hepatitis C Ab Non-reactive Non-reactive   CBC auto differential   Result Value Ref Range    WBC 5.08 3.90 - 12.70 K/uL    RBC 4.76 4.00 - 5.40 M/uL    Hemoglobin 13.5 12.0 - 16.0 g/dL    Hematocrit 41.5 37.0 - 48.5 %    MCV 87 82 - 98 fL    MCH 28.4 27.0 - 31.0 pg    MCHC 32.5 32.0 - 36.0 g/dL    RDW 12.7 11.5 - 14.5 %    Platelets 305 150 - 450 K/uL    MPV 9.6 9.2 - 12.9 fL    Immature Granulocytes 0.2 0.0 - 0.5 %    Gran # (ANC) 3.7 1.8 - 7.7 K/uL    Immature Grans (Abs) 0.01 0.00 - 0.04 K/uL    Lymph # 0.9 (L) 1.0 - 4.8 K/uL    Mono # 0.3 0.3 - 1.0 K/uL    Eos # 0.1 0.0 - 0.5 K/uL    Baso # 0.07 0.00 - 0.20 K/uL    nRBC 0 0 /100 WBC    Gran % 73.4 (H) 38.0 - 73.0 %    Lymph % 16.7 (L) 18.0 - 48.0 %    Mono % 6.5 4.0 - 15.0 %    Eosinophil % 1.8 0.0 - 8.0 %    Basophil % 1.4 0.0 - 1.9 %    Differential Method Automated    Comprehensive metabolic panel   Result Value Ref Range     Sodium 140 136 - 145 mmol/L    Potassium 4.4 3.5 - 5.1 mmol/L    Chloride 107 95 - 110 mmol/L    CO2 24 23 - 29 mmol/L    Glucose 106 70 - 110 mg/dL    BUN 9 6 - 20 mg/dL    Creatinine 0.7 0.5 - 1.4 mg/dL    Calcium 9.8 8.7 - 10.5 mg/dL    Total Protein 7.8 6.0 - 8.4 g/dL    Albumin 4.0 3.5 - 5.2 g/dL    Total Bilirubin 0.3 0.1 - 1.0 mg/dL    Alkaline Phosphatase 80 55 - 135 U/L    AST 18 10 - 40 U/L    ALT 15 10 - 44 U/L    eGFR >60.0 >60 mL/min/1.73 m^2    Anion Gap 9 8 - 16 mmol/L   Lipase   Result Value Ref Range    Lipase 21 4 - 60 U/L   Troponin I   Result Value Ref Range    Troponin I <0.006 0.000 - 0.026 ng/mL   Brain natriuretic peptide   Result Value Ref Range    BNP <10 0 - 99 pg/mL   D dimer, quantitative   Result Value Ref Range    D-Dimer <0.19 <0.50 mg/L FEU   EKG 12-lead   Result Value Ref Range    QRS Duration 68 ms    [facility] QTC Calculation 433 ms          ECG Results              EKG 12-lead (Final result)        Collection Time Result Time QRS Duration [facility] QTC Calculation    03/13/24 09:20:41 03/13/24 11:24:18 68 433                     Final result by Interface, Lab In [facility] (03/13/24 11:24:23)                   Narrative:    Test Reason : R07.89,    Vent. Rate : 078 BPM     Atrial Rate : 078 BPM     P-R Int : 140 ms          QRS Dur : 068 ms      QT Int : 380 ms       P-R-T Axes : 062 007 010 degrees     QTc Int : 433 ms    Normal sinus rhythm  Low septal forces ; Abnormal R wave progression in the precordial leads  Otherwise Normal ECG  No previous ECGs available  Confirmed by Ken LEDEZMA MD ([id number]) on 3/13/2024 11:24:17 AM    Referred By: [email or web address]   SELF           Confirmed By:Ken LEDEZMA MD                                  Imaging Results              X-Ray Chest PA And Lateral (Final result)  Result time 03/13/24 10:40:40      Final result by Parul Palomares MD (03/13/24 10:40:40)                   Impression:      No acute cardiopulmonary process seen.      Electronically signed  by: Parul Palomares  Date:    03/13/2024  Time:    10:40               Narrative:    EXAMINATION:  XR CHEST PA AND LATERAL    CLINICAL HISTORY:  Chest pain, unspecified    TECHNIQUE:  PA and lateral views of the chest were performed.    COMPARISON:  None    FINDINGS:  Lungs are well expanded.  No acute consolidation, pleural effusion, or pneumothorax seen.    Cardiac silhouette is normal in size.                                       Medications   aspirin tablet 325 mg (325 mg Oral Given 3/13/24 1032)     Medical Decision Making  Amount and/or Complexity of Data Reviewed  Labs: ordered.  Radiology: ordered.  ECG/medicine tests:  Decision-making details documented in ED Course.    Risk  OTC drugs.      Additional MDM:     EKG: I have independently interpreted EKG(s) - see notes., EKG - Independent Interpretation - Normal sinus rhythm, normal rate, no acute changes. ER attending physician reviewed and signed - NSR< normal rate, no ischemia      X-Rays: I have independently interpreted X-Ray(s) - see notes., Chest X-Ray - Independent Interpretation - Heart size normal, Lungs clear, No acute abnormality.   PERC Rule:   Age is greater than or equal to 50 = 1.0  Heart Rate is greater than or equal to 100 = 0.0  SaO2 on room air < 95% = 0.0  Unilateral leg swelling = 0.0  Hemoptysis = 0.0  Recent surgery or trauma = 0.0  Prior PE or DVT =  0.0  Hormone use = 0.00  PERC Score = 1      Heart Score:    History:          Slightly suspicious.  ECG:             Normal  Age:               45-65 years  Risk factors: 1-2 risk factors  Troponin:       Less than or equal to normal limit  Heart Score = 2                ED Course as of 03/13/24 1142   Wed Mar 13, 2024   0948 EKG 12-lead  I reviewed this patient's EKG.  Normal sinus rhythm, normal intervals, no ischemia [AC]      ED Course User Index  [AC] Theodore Grant, DO               Medical Decision Making:   Initial Assessment:   58-year-old female presents to the ER for an  urgent evaluation complaining of acute intermittent episodes of sharp left-sided chest pain that does not radiate the last 1-2 seconds and resolves.  Episode started around 7:00 a.m. this morning.  Patient reports 7-8 episodes total.   Differential Diagnosis:   ACS, PE, pleurisy, dysrhythmia, costochondritis, anxiety, GI, pericarditis, myocarditis, musculoskeletal pain, aortic dissection, etc   Clinical Tests:   Lab Tests: Ordered and Reviewed  Radiological Study: Ordered and Reviewed  Medical Tests: Ordered and Reviewed  ED Management:  Vital signes reviewed, elevated BP noted, no Hx of HTN   Chart review completed  - no previous echo, stress, cath  EKG reviewed and signed by the ER attending physician NSR -  normal rate, no ischemia   Chest x ray unremarkable   Initial labs unremarkable - negative 1st troponin, negative BNP, negative D-dimer  I discussed the case with the ER attending physician   I discussed the case with the EDOU ARI who is agreeable to taking the patient to obs for chest pain rule out, plan will be for serial troponin levels, cardiac monitoring, stress/echo       Other:   I have discussed this case with another health care provider.  Medical complexity: moderate risk              Clinical Impression:  Final diagnoses:  [R07.89] Chest discomfort  [R07.9] Chest pain (Primary)  [R03.0] Elevated blood pressure reading without diagnosis of hypertension          ED Disposition Condition    Observation Stable                Galdino Pennington PA-C  03/13/24 3681

## 2024-03-13 NOTE — ED TRIAGE NOTES
Patient presents to ER for evaluation of intermittent, non-radiating chest pain while driving to work this morning--occurring every 2-3 minutes. Describes the pain as squeezing and cramping pain. Denies weakness, dizziness, N/V, or shortness of breath. Endorses feelings of anxiety.States that blood pressure has been elevated this morning.

## 2024-03-14 VITALS
OXYGEN SATURATION: 99 % | BODY MASS INDEX: 29.73 KG/M2 | HEART RATE: 79 BPM | SYSTOLIC BLOOD PRESSURE: 135 MMHG | DIASTOLIC BLOOD PRESSURE: 77 MMHG | WEIGHT: 185 LBS | TEMPERATURE: 98 F | HEIGHT: 66 IN | RESPIRATION RATE: 18 BRPM

## 2024-03-14 PROBLEM — R07.9 CHEST PAIN: Status: ACTIVE | Noted: 2024-03-14

## 2024-03-14 LAB
ASCENDING AORTA: 2.84 CM
AV INDEX (PROSTH): 0.73
AV MEAN GRADIENT: 5 MMHG
AV PEAK GRADIENT: 9 MMHG
AV VALVE AREA BY VELOCITY RATIO: 2.57 CM²
AV VALVE AREA: 2.47 CM²
AV VELOCITY RATIO: 0.76
BSA FOR ECHO PROCEDURE: 1.98 M2
CV ECHO LV RWT: 0.31 CM
CV STRESS BASE HR: 70 BPM
DIASTOLIC BLOOD PRESSURE: 74 MMHG
DOP CALC AO PEAK VEL: 1.48 M/S
DOP CALC AO VTI: 31.17 CM
DOP CALC LVOT AREA: 3.4 CM2
DOP CALC LVOT DIAMETER: 2.08 CM
DOP CALC LVOT PEAK VEL: 1.12 M/S
DOP CALC LVOT STROKE VOLUME: 77.13 CM3
DOP CALCLVOT PEAK VEL VTI: 22.71 CM
E WAVE DECELERATION TIME: 184.16 MSEC
E/A RATIO: 0.89
E/E' RATIO: 8.56 M/S
ECHO LV POSTERIOR WALL: 0.66 CM (ref 0.6–1.1)
FRACTIONAL SHORTENING: 41 % (ref 28–44)
INTERVENTRICULAR SEPTUM: 0.7 CM (ref 0.6–1.1)
LA MAJOR: 3.91 CM
LA MINOR: 4.05 CM
LA WIDTH: 3.62 CM
LEFT ATRIUM SIZE: 2.65 CM
LEFT ATRIUM VOLUME INDEX MOD: 17.3 ML/M2
LEFT ATRIUM VOLUME INDEX: 16.8 ML/M2
LEFT ATRIUM VOLUME MOD: 33.37 CM3
LEFT ATRIUM VOLUME: 32.44 CM3
LEFT INTERNAL DIMENSION IN SYSTOLE: 2.53 CM (ref 2.1–4)
LEFT VENTRICLE DIASTOLIC VOLUME INDEX: 42.48 ML/M2
LEFT VENTRICLE DIASTOLIC VOLUME: 81.99 ML
LEFT VENTRICLE MASS INDEX: 44 G/M2
LEFT VENTRICLE SYSTOLIC VOLUME INDEX: 12 ML/M2
LEFT VENTRICLE SYSTOLIC VOLUME: 23.1 ML
LEFT VENTRICULAR INTERNAL DIMENSION IN DIASTOLE: 4.28 CM (ref 3.5–6)
LEFT VENTRICULAR MASS: 84.63 G
LV LATERAL E/E' RATIO: 6.42 M/S
LV SEPTAL E/E' RATIO: 12.83 M/S
MV A" WAVE DURATION": 9.42 MSEC
MV PEAK A VEL: 0.87 M/S
MV PEAK E VEL: 0.77 M/S
MV STENOSIS PRESSURE HALF TIME: 53.41 MS
MV VALVE AREA P 1/2 METHOD: 4.12 CM2
OHS CV CPX 1 MINUTE RECOVERY HEART RATE: 141 BPM
OHS CV CPX 85 PERCENT MAX PREDICTED HEART RATE MALE: 139
OHS CV CPX ESTIMATED METS: 13
OHS CV CPX MAX PREDICTED HEART RATE: 164
OHS CV CPX PATIENT IS FEMALE: 1
OHS CV CPX PATIENT IS MALE: 0
OHS CV CPX PEAK DIASTOLIC BLOOD PRESSURE: 74 MMHG
OHS CV CPX PEAK HEAR RATE: 181 BPM
OHS CV CPX PEAK RATE PRESSURE PRODUCT: NORMAL
OHS CV CPX PEAK SYSTOLIC BLOOD PRESSURE: 178 MMHG
OHS CV CPX PERCENT MAX PREDICTED HEART RATE ACHIEVED: 115
OHS CV CPX RATE PRESSURE PRODUCT PRESENTING: 9590
PISA TR MAX VEL: 1.87 M/S
POST STRESS EJECTION FRACTION: 75 %
PULM VEIN S/D RATIO: 1.23
PV PEAK D VEL: 0.56 M/S
PV PEAK S VEL: 0.69 M/S
RA MAJOR: 4 CM
RA PRESSURE ESTIMATED: 3 MMHG
RA WIDTH: 2.9 CM
RIGHT VENTRICULAR END-DIASTOLIC DIMENSION: 2.86 CM
RV TB RVSP: 5 MMHG
SINUS: 2.85 CM
STJ: 2.35 CM
STRESS ECHO POST EXERCISE DUR MIN: 8 MINUTES
STRESS ECHO POST EXERCISE DUR SEC: 0 SECONDS
SYSTOLIC BLOOD PRESSURE: 137 MMHG
TDI LATERAL: 0.12 M/S
TDI SEPTAL: 0.06 M/S
TDI: 0.09 M/S
TR MAX PG: 14 MMHG
TRICUSPID ANNULAR PLANE SYSTOLIC EXCURSION: 2.59 CM
TV REST PULMONARY ARTERY PRESSURE: 17 MMHG
Z-SCORE OF LEFT VENTRICULAR DIMENSION IN END DIASTOLE: -2.44
Z-SCORE OF LEFT VENTRICULAR DIMENSION IN END SYSTOLE: -2.25

## 2024-03-14 PROCEDURE — 25000003 PHARM REV CODE 250: Performed by: NURSE PRACTITIONER

## 2024-03-14 PROCEDURE — G0378 HOSPITAL OBSERVATION PER HR: HCPCS

## 2024-03-14 RX ADMIN — NITROFURANTOIN MONOHYDRATE/MACROCRYSTALS 100 MG: 25; 75 CAPSULE ORAL at 11:03

## 2024-03-14 NOTE — PROGRESS NOTES
ED Observation Unit  Progress Note      HPI   The patient is a 58-year-old female. She has a past medical history of hypothyroidism and abnormal nipple discharge of left breast. She presents to the ER for an emergent evaluation due to chest pain. She denies any known cardiac history. She states that heart disease does run in her family. 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 She states that this morning while driving to work she experienced a sudden onset of a sharp pain to her left chest. Also states it felt like cramping.  She states that the pain did not radiate anywhere. She states the pain lasted a few seconds then completely went away. She states that she has continued to experience multiple episodes exactly like this one since onset. She denies any triggering or inciting factors. She denies any shortness of breath, palpitations, dizziness, nausea, diaphoresis. She states the pain is not worse with a deep breath or to palpation of her chest. She denies any increased pain with movement or position. She denies any history of PE or DVT. She denies any calf pain. She states that she has been under increased stress recently due to death of a family member. She states that she does feel anxious, but notes that her anxiety did not start until after she experienced the chest discomfort. No additional complaints or concerns reported. No pre arrival treatment.       Interval History   Patient has not experienced any additional chest discomfort since arriving to the ED. VSS. No acute events overnight.     PMHx   Past Medical History:   Diagnosis Date    Hypothyroidism       Past Surgical History:   Procedure Laterality Date    TUBAL LIGATION      uterine ablation  2012        Family Hx   Family History   Problem Relation Age of Onset    Heart disease Mother     Heart disease Father     Breast cancer Paternal Grandmother         Social Hx   Social History     Socioeconomic History    Marital status:     Tobacco Use    Smoking status: Never    Smokeless tobacco: Never   Substance and Sexual Activity    Alcohol use: Not Currently    Drug use: Never        Vital Signs   Vitals:    03/13/24 1703 03/13/24 1925 03/13/24 2016 03/14/24 0255   BP: 136/82 118/68 122/72 135/77   BP Location: Right arm      Patient Position: Lying      Pulse: 70 66 70 79   Resp: 18   18   Temp: 98 °F (36.7 °C) 98.1 °F (36.7 °C) 98.6 °F (37 °C) 97.7 °F (36.5 °C)   TempSrc: Oral Oral Oral Oral   SpO2: 98% 97% 97% 99%   Weight:       Height:            Review of Systems  Review of Systems   Cardiovascular:  Positive for chest pain (resolved).       Brief Physical Exam/Reassessment   Physical Exam  Vitals reviewed.   Constitutional:       General: She is not in acute distress.     Appearance: She is not diaphoretic.   HENT:      Head: Normocephalic and atraumatic.   Cardiovascular:      Rate and Rhythm: Normal rate and regular rhythm.      Pulses:           Radial pulses are 2+ on the right side and 2+ on the left side.      Heart sounds: Heart sounds not distant. No murmur heard.     No friction rub. No gallop.   Pulmonary:      Effort: Pulmonary effort is normal. No respiratory distress.      Breath sounds: Normal breath sounds. No decreased breath sounds, wheezing, rhonchi or rales.   Musculoskeletal:      Cervical back: Neck supple.      Right lower leg: No edema.      Left lower leg: No edema.   Skin:     General: Skin is warm and dry.   Neurological:      Mental Status: She is alert.   Psychiatric:         Mood and Affect: Mood and affect normal.         Labs/Imaging   Labs Reviewed   CBC W/ AUTO DIFFERENTIAL - Abnormal; Notable for the following components:       Result Value    Lymph # 0.9 (*)     Gran % 73.4 (*)     Lymph % 16.7 (*)     All other components within normal limits   HIV 1 / 2 ANTIBODY    Narrative:     Release to patient->Immediate   HEPATITIS C ANTIBODY    Narrative:     Release to patient->Immediate   COMPREHENSIVE  METABOLIC PANEL   LIPASE   TROPONIN I   B-TYPE NATRIURETIC PEPTIDE   D DIMER, QUANTITATIVE   TROPONIN I   TROPONIN I      Imaging Results              X-Ray Chest PA And Lateral (Final result)  Result time 03/13/24 10:40:40      Final result by Parul Palomares MD (03/13/24 10:40:40)                   Impression:      No acute cardiopulmonary process seen.      Electronically signed by: Parul Palomares  Date:    03/13/2024  Time:    10:40               Narrative:    EXAMINATION:  XR CHEST PA AND LATERAL    CLINICAL HISTORY:  Chest pain, unspecified    TECHNIQUE:  PA and lateral views of the chest were performed.    COMPARISON:  None    FINDINGS:  Lungs are well expanded.  No acute consolidation, pleural effusion, or pneumothorax seen.    Cardiac silhouette is normal in size.                                       I reviewed all labs, imaging, EKGs.     Plan   Chest Pain  -Heart Score 2, family Hx of CAD. Elevated BP noted. No hx of HTN.  -ED EKG shows normal rate, no ischemia. Chest x ray unremarkable. Initial labs unremarkable   -Received full dose ASA in the ED  -troponin negative x3, negative BNP, negative D-dimer  -place in ED[facility] for serial troponin levels, cardiac monitoring, stress/echo   -stress echo scheduled for 0915.  Pt has been NPO since dinner last night.     I have discussed this case with CARLENE Oswald.

## 2024-03-14 NOTE — ED NOTES
LOC: The patient is awake, alert, aware of environment with an appropriate affect. Oriented x4, speaking appropriately  APPEARANCE: Pt resting comfortably, in no acute distress, pt is clean and well groomed, clothing properly fastened  SKIN:The skin is warm and dry, color consistent with ethnicity, patient has normal skin turgor and moist mucus membranes, no bruising noted   RESPIRATORY:Airway is open and patent, respirations are spontaneous, patient has a normal effort and rate, no accessory muscle use noted.  CARDIAC: Normal rate and rhythm, no peripheral edema noted, capillary refill < 3 seconds, bilateral radial pulses 2+. Complaining of throbbing cheat pain that started at 0730 this morning. Pt states she is feeling better now denies SOB   ABDOMEN: Soft, non tender, non distended. Bowel sounds present x 4 quadrants.   NEUROLOGIC: PERRLA, facial expression is symmetrical, patient moving all extremities spontaneously, normal sensation in all extremities when touched with a finger.  Follows all commands appropriately  MUSCULOSKELETAL: Patient moving all extremities spontaneously, no obvious swelling or deformities noted.

## 2024-03-14 NOTE — PLAN OF CARE
[facility] - Emergency Dept  Initial Discharge Assessment       Primary Care Provider: Joe Bender MD    Admission Diagnosis: Chest pain [R07.9]    Admission Date: 3/13/2024  Expected Discharge Date:     Pt stated she is independent with her ambulation and ADL's and does not require equipment or assistance    Pt to d/c home with no needs when ready    Transition of Care Barriers: (P) None    Payor: AETNA / Plan: AET[employer] CHOICE POS / Product Type: Commercial /     Extended Emergency Contact Information  Primary Emergency Contact: JatinderLeonard  Address: [address] [place] [address] [facility]  Home Phone: [phone]  Mobile Phone: [phone]  Relation: Spouse  Secondary Emergency Contact: David [place] [address] [place]  Mobile Phone: [phone]  Relation: Daughter    Discharge Plan A: (P) Home  Discharge Plan B: (P) Home      [id number]  [place] - [address] [facility] [address]  [address] [place] [address]  Phone: [phone] Fax: [phone]      Initial Assessment (most recent)       Adult Discharge Assessment - 03/14/24 0843          Discharge Assessment    Assessment Type Discharge Planning Assessment (P)      Confirmed/corrected address, phone number and insurance Yes (P)      Confirmed Demographics Correct on Facesheet (P)      Source of Information patient (P)      People in Home spouse (P)      Facility Arrived From: home (P)      Do you expect to return to your current living situation? Yes (P)      Do you have help at home or someone to help you manage your care at home? No (P)      Prior to hospitilization cognitive status: Alert/Oriented;No Deficits (P)      Current cognitive status: Alert/Oriented;No Deficits (P)      Walking or Climbing Stairs Difficulty no (P)      Dressing/Bathing Difficulty no (P)      Home Accessibility stairs to enter home (P)      Number of  Stairs, Main Entrance three (P)      Home Layout Able to live on 1st floor (P)      Equipment Currently Used at Home none (P)      Patient currently being followed by outpatient case management? No (P)      Do you currently have service(s) that help you manage your care at home? No (P)      Do you have any problems affording any of your prescribed medications? No (P)      Is the patient taking medications as prescribed? yes (P)      Who is going to help you get home at discharge? family/friends (P)      How do you get to doctors appointments? car, drives self (P)      Are you on dialysis? No (P)      Do you take coumadin? No (P)      Discharge Plan A Home (P)      Discharge Plan B Home (P)      DME Needed Upon Discharge  none (P)      Discharge Plan discussed with: Patient (P)      Transition of Care Barriers None (P)         Physical Activity    On average, how many days per week do you engage in moderate to strenuous exercise (like a brisk walk)? 0 days (P)      On average, how many minutes do you engage in exercise at this level? 0 min (P)         Financial Resource Strain    How hard is it for you to pay for the very basics like food, housing, medical care, and heating? Not very hard (P)         Housing Stability    In the last 12 months, was there a time when you were not able to pay the mortgage or rent on time? No (P)      In the last 12 months, was there a time when you did not have a steady place to sleep or slept in a shelter (including now)? No (P)         Transportation Needs    In the past 12 months, has lack of transportation kept you from medical appointments or from getting medications? No (P)      In the past 12 months, has lack of transportation kept you from meetings, work, or from getting things needed for daily living? No (P)         Food Insecurity    Within the past 12 months, you worried that your food would run out before you got the money to buy more. Never true (P)      Within the past 12  months, the food you bought just didn't last and you didn't have money to get more. Never true (P)         Stress    Do you feel stress - tense, restless, nervous, or anxious, or unable to sleep at night because your mind is troubled all the time - these days? To some extent (P)         Social Connections    In a typical week, how many times do you talk on the phone with family, friends, or neighbors? More than three times a week (P)      How often do you get together with friends or relatives? More than three times a week (P)      How often do you attend Bahai or Scientology services? More than 4 times per year (P)      Do you belong to any clubs or organizations such as Bahai groups, unions, fraternal or athletic groups, or school groups? Yes (P)      How often do you attend meetings of the clubs or organizations you belong to? More than 4 times per year (P)      Are you , , , , never , or living with a partner?  (P)         Alcohol Use    Q1: How often do you have a drink containing alcohol? Never (P)      Q2: How many drinks containing alcohol do you have on a typical day when you are drinking? Patient does not drink (P)      Q3: How often do you have six or more drinks on one occasion? Never (P)         OTHER    Name(s) of People in Home spouse Leonard (P)                       Dina Edmond CD, MSW, LMSW, RSW   Case Management  [facility]  Email: [email or web address]

## 2024-03-14 NOTE — DISCHARGE SUMMARY
ED Observation Unit  Discharge Summary        History of Present Illness:    The patient is a 58-year-old female. She has a past medical history of hypothyroidism and abnormal nipple discharge of left breast. She presents to the ER for an emergent evaluation due to chest pain. She denies any known cardiac history. She states that heart disease does run in her family. 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 She states that this morning while driving to work she experienced a sudden onset of a sharp pain to her left chest. Also states it felt like cramping.  She states that the pain did not radiate anywhere. She states the pain lasted a few seconds then completely went away. She states that she has continued to experience multiple episodes exactly like this one since onset. She denies any triggering or inciting factors. She denies any shortness of breath, palpitations, dizziness, nausea, diaphoresis. She states the pain is not worse with a deep breath or to palpation of her chest. She denies any increased pain with movement or position. She denies any history of PE or DVT. She denies any calf pain. She states that she has been under increased stress recently due to death of a family member. She states that she does feel anxious, but notes that her anxiety did not start until after she experienced the chest discomfort. No additional complaints or concerns reported. No pre arrival treatment.        Observation Course:    Patient placed in Observation for ACS rule out.  Troponins x3 are negative.  Exercise stress echo was negative for ischemia or other acute abnormality.  Patient had no recurrence of her chest pain during Obs course.  Vital signs stable.  She was discharged in stable condition.  Cardiology referral placed as patient has family cardiac history.     Consultants:    N/a    Final Diagnosis:  Chest pain    Discharge Condition: Good    Disposition: Home or Self Care     Time spent on the discharge  of the patient including review of hospital course with the patient. reviewing discharge medications and arranging follow-up care 35 minutes.  Patient was seen and examined on the date of discharge and determined to be suitable for discharge.    Follow Up:  No future appointments.

## 2024-05-06 ENCOUNTER — PATIENT MESSAGE (OUTPATIENT)
Dept: ADMINISTRATIVE | Facility: HOSPITAL | Age: 59
End: 2024-05-06
Payer: COMMERCIAL

## 2024-05-07 DIAGNOSIS — Z12.11 SCREENING FOR COLON CANCER: ICD-10-CM

## 2024-05-21 ENCOUNTER — OFFICE VISIT (OUTPATIENT)
Dept: CARDIOLOGY | Facility: CLINIC | Age: 59
End: 2024-05-21
Payer: COMMERCIAL

## 2024-05-21 VITALS
HEIGHT: 66 IN | OXYGEN SATURATION: 99 % | RESPIRATION RATE: 18 BRPM | DIASTOLIC BLOOD PRESSURE: 74 MMHG | HEART RATE: 75 BPM | BODY MASS INDEX: 29.63 KG/M2 | WEIGHT: 184.38 LBS | SYSTOLIC BLOOD PRESSURE: 125 MMHG

## 2024-05-21 DIAGNOSIS — R07.2 PRECORDIAL PAIN: Primary | ICD-10-CM

## 2024-05-21 PROCEDURE — 99204 OFFICE O/P NEW MOD 45 MIN: CPT | Mod: S$GLB,,, | Performed by: INTERNAL MEDICINE

## 2024-05-21 PROCEDURE — 99999 PR PBB SHADOW E&M-EST. PATIENT-LVL III: CPT | Mod: PBBFAC,,, | Performed by: INTERNAL MEDICINE

## 2024-05-21 NOTE — PROGRESS NOTES
[facility]    Chief Complaint  Chief Complaint   Patient presents with    Chest Pain       HPI:    She went to the emergency room in March for chest discomfort.  Had started as she was on her way to work.  [place] like a spasm on the left side of her chest.  She checked her blood pressure was high.  Spasm would last for a couple of minutes before resolving.  Occurred repeatedly throughout the morning.  So she went to the emergency department.  Cardiac evaluation there was unrevealing.  Was kept for observation overnight.  Had a stress test following morning which was negative for ischemia.  Shortly thereafter she went to [place] to visit her father who was in the hospital.  She had some heaviness in her left arm.  Nonexertional.  That is now resolved also.    Medications  Current Outpatient Medications   Medication Sig Dispense Refill    nitrofurantoin, macrocrystal-monohydrate, (MACROBID) 100 MG capsule Take 1 capsule (100 mg total) by mouth 2 (two) times daily. 14 capsule 0     No current facility-administered medications for this visit.        History  Past Medical History:   Diagnosis Date    Hypothyroidism      Past Surgical History:   Procedure Laterality Date    TUBAL LIGATION      uterine ablation  2012     Social History     Socioeconomic History    Marital status:    Tobacco Use    Smoking status: Never    Smokeless tobacco: Never   Substance and Sexual Activity    Alcohol use: Not Currently    Drug use: Never     Social Determinants of Health     Financial Resource Strain: Low Risk  (5/20/2024)    Overall Financial Resource Strain (CARDIA)     Difficulty of Paying Living Expenses: Not hard at all   Food Insecurity: No Food Insecurity (5/20/2024)    Hunger Vital Sign     Worried About Running Out of Food in the Last Year: Never true     Ran Out of Food in the Last Year: Never true   Transportation Needs: No Transportation Needs (3/14/2024)    PRAPARE - Transportation     Lack of  Transportation (Medical): No     Lack of Transportation (Non-Medical): No   Physical Activity: Inactive (5/20/2024)    Exercise Vital Sign     Days of Exercise per Week: 0 days     Minutes of Exercise per Session: 0 min   Stress: No Stress Concern Present (5/20/2024)    [employer] - Occupational Stress Questionnaire     Feeling of Stress : Not at all   Recent Concern: Stress - Stress Concern Present (3/14/2024)    [employer] - Occupational Stress Questionnaire     Feeling of Stress : To some extent   Housing Stability: Unknown (3/14/2024)    Housing Stability Vital Sign     Unable to Pay for Housing in the Last Year: No     Unstable Housing in the Last Year: No     Family History   Problem Relation Name Age of Onset    Heart disease Mother      Heart disease Father      Breast cancer Paternal Grandmother Leanne         Allergies  Review of patient's allergies indicates:   Allergen Reactions    Cephalexin Hives       Review of Systems   Review of Systems   Constitutional: Negative for malaise/fatigue, weight gain and weight loss.   Eyes:  Negative for visual disturbance.   Cardiovascular:  Positive for chest pain. Negative for claudication, cyanosis, dyspnea on exertion, irregular heartbeat, leg swelling, near-syncope, orthopnea, palpitations, paroxysmal nocturnal dyspnea and syncope.   Respiratory:  Negative for cough, hemoptysis, shortness of breath, sleep disturbances due to breathing and wheezing.    Hematologic/Lymphatic: Negative for bleeding problem. Does not bruise/bleed easily.   Skin:  Negative for poor wound healing.   Musculoskeletal:  Negative for muscle cramps and myalgias.   Gastrointestinal:  Negative for abdominal pain, anorexia, diarrhea, heartburn, hematemesis, hematochezia, melena, nausea and vomiting.   Genitourinary:  Negative for hematuria and nocturia.   Neurological:  Negative for excessive daytime sleepiness, dizziness, focal  weakness, light-headedness and weakness.       Physical Exam  Vitals:    05/21/24 1443   BP: 125/74   Pulse: 75   Resp: 18     Wt Readings from Last 1 Encounters:   05/21/24 83.6 kg (184 lb 6.4 oz)     Physical Exam  Vitals and nursing note reviewed.   Constitutional:       General: She is not in acute distress.     Appearance: She is not toxic-appearing or diaphoretic.   HENT:      Head: Normocephalic and atraumatic.      Mouth/Throat:      Lips: Pink.      Mouth: Mucous membranes are moist.   Eyes:      General: No scleral icterus.     Conjunctiva/sclera: Conjunctivae normal.   Neck:      Thyroid: No thyromegaly.      Vascular: No carotid bruit, hepatojugular reflux or JVD.      Trachea: Trachea normal.   Cardiovascular:      Rate and Rhythm: Normal rate and regular rhythm. No extrasystoles are present.     Chest Wall: PMI is not displaced.      Pulses:           Carotid pulses are 2+ on the right side and 2+ on the left side.       Radial pulses are 2+ on the right side and 2+ on the left side.        Dorsalis pedis pulses are 2+ on the right side and 2+ on the left side.        Posterior tibial pulses are 2+ on the right side and 2+ on the left side.      Heart sounds: S1 normal and S2 normal. No murmur heard.     No friction rub. No S3 or S4 sounds.   Pulmonary:      Effort: Pulmonary effort is normal. No accessory muscle usage or respiratory distress.      Breath sounds: Normal breath sounds and air entry. No decreased breath sounds, wheezing, rhonchi or rales.   Abdominal:      General: Bowel sounds are normal. There is no distension or abdominal bruit.      Palpations: Abdomen is soft. There is no hepatomegaly, splenomegaly or pulsatile mass.      Tenderness: There is no abdominal tenderness.   Musculoskeletal:         General: No tenderness or deformity.      Right lower leg: No edema.      Left lower leg: No edema.   Skin:     General: Skin is warm and dry.      Capillary Refill: Capillary refill takes  less than 2 seconds.      Coloration: Skin is not cyanotic or pale.      Nails: There is no clubbing.   Neurological:      General: No focal deficit present.      Mental Status: She is alert and oriented to person, place, and time.   Psychiatric:         Attention and Perception: Attention normal.         Mood and Affect: Mood normal.         Speech: Speech normal.         Behavior: Behavior normal. Behavior is cooperative.         Labs  Admission on 03/13/2024, Discharged on 03/14/2024   Component Date Value Ref Range Status    QRS Duration 03/13/2024 68  ms Final    [facility] QTC Calculation 03/13/2024 433  ms Final    HIV 1/2 Ag/Ab 03/13/2024 Non-reactive  Non-reactive Final    Hepatitis C Ab 03/13/2024 Non-reactive  Non-reactive Final    WBC 03/13/2024 5.08  3.90 - 12.70 K/uL Final    RBC 03/13/2024 4.76  4.00 - 5.40 M/uL Final    Hemoglobin 03/13/2024 13.5  12.0 - 16.0 g/dL Final    Hematocrit 03/13/2024 41.5  37.0 - 48.5 % Final    MCV 03/13/2024 87  82 - 98 fL Final    MCH 03/13/2024 28.4  27.0 - 31.0 pg Final    MCHC 03/13/2024 32.5  32.0 - 36.0 g/dL Final    RDW 03/13/2024 12.7  11.5 - 14.5 % Final    Platelets 03/13/2024 305  150 - 450 K/uL Final    MPV 03/13/2024 9.6  9.2 - 12.9 fL Final    Immature Granulocytes 03/13/2024 0.2  0.0 - 0.5 % Final    Gran # (ANC) 03/13/2024 3.7  1.8 - 7.7 K/uL Final    Immature Grans (Abs) 03/13/2024 0.01  0.00 - 0.04 K/uL Final    Comment: Mild elevation in immature granulocytes is non specific and   can be seen in a variety of conditions including stress response,   acute inflammation, trauma and pregnancy. Correlation with other   laboratory and clinical findings is essential.      Lymph # 03/13/2024 0.9 (L)  1.0 - 4.8 K/uL Final    Mono # 03/13/2024 0.3  0.3 - 1.0 K/uL Final    Eos # 03/13/2024 0.1  0.0 - 0.5 K/uL Final    Baso # 03/13/2024 0.07  0.00 - 0.20 K/uL Final    nRBC 03/13/2024 0  0 /100 WBC Final    Gran % 03/13/2024 73.4 (H)  38.0 - 73.0 % Final    Lymph %  03/13/2024 16.7 (L)  18.0 - 48.0 % Final    Mono % 03/13/2024 6.5  4.0 - 15.0 % Final    Eosinophil % 03/13/2024 1.8  0.0 - 8.0 % Final    Basophil % 03/13/2024 1.4  0.0 - 1.9 % Final    Differential Method 03/13/2024 Automated   Final    Sodium 03/13/2024 140  136 - 145 mmol/L Final    Potassium 03/13/2024 4.4  3.5 - 5.1 mmol/L Final    Chloride 03/13/2024 107  95 - 110 mmol/L Final    CO2 03/13/2024 24  23 - 29 mmol/L Final    Glucose 03/13/2024 106  70 - 110 mg/dL Final    BUN 03/13/2024 9  6 - 20 mg/dL Final    Creatinine 03/13/2024 0.7  0.5 - 1.4 mg/dL Final    Calcium 03/13/2024 9.8  8.7 - 10.5 mg/dL Final    Total Protein 03/13/2024 7.8  6.0 - 8.4 g/dL Final    Albumin 03/13/2024 4.0  3.5 - 5.2 g/dL Final    Total Bilirubin 03/13/2024 0.3  0.1 - 1.0 mg/dL Final    Comment: For infants and newborns, interpretation of results should be based  on gestational age, weight and in agreement with clinical  observations.    Premature Infant recommended reference ranges:  Up to 24 hours.............<8.0 mg/dL  Up to 48 hours............<12.0 mg/dL  3-5 days..................<15.0 mg/dL  6-29 days.................<15.0 mg/dL      Alkaline Phosphatase 03/13/2024 80  55 - 135 U/L Final    AST 03/13/2024 18  10 - 40 U/L Final    ALT 03/13/2024 15  10 - 44 U/L Final    eGFR 03/13/2024 >60.0  >60 mL/min/1.73 m^2 Final    Anion Gap 03/13/2024 9  8 - 16 mmol/L Final    Lipase 03/13/2024 21  4 - 60 U/L Final    Troponin I 03/13/2024 <0.006  0.000 - 0.026 ng/mL Final    Comment: The reference interval for Troponin I represents the 99th percentile   cutoff   for our facility and is consistent with 3rd generation assay   performance.      BNP 03/13/2024 <10  0 - 99 pg/mL Final    Values of less than 100 pg/ml are consistent with non-CHF populations.    D-Dimer 03/13/2024 <0.19  <0.50 mg/L FEU Final    Comment: The quantitative D-dimer assay should be used as an aid in   the diagnosis of deep vein thrombosis and pulmonary  "embolism  in patients with the appropriate presentation and clinical  history. The upper limit of the reference interval and the clinical   cut off   point are identical. Causes of a positive (>0.50 mg/L FEU) D-Dimer   test  include, but are not limited to: DVT, PE, DIC, thrombolytic   therapy, anticoagulant therapy, recent surgery, trauma, or   pregnancy, disseminated malignancy, aortic aneurysm, cirrhosis,  and severe infection. False negative results may occur in   patients with distal DVT.      Troponin I 03/13/2024 <0.006  0.000 - 0.026 ng/mL Final    Comment: The reference interval for Troponin I represents the 99th percentile   cutoff   for our facility and is consistent with 3rd generation assay   performance.      Troponin I 03/13/2024 <0.006  0.000 - 0.026 ng/mL Final    Comment: The reference interval for Troponin I represents the 99th percentile   cutoff   for our facility and is consistent with 3rd generation assay   performance.      RA Width 03/14/2024 2.90  cm Final    LA volume (mod) 03/14/2024 33.37  cm3 Final    Left Atrium Major Axis 03/14/2024 3.91  cm Final    Left Atrium Minor Axis 03/14/2024 4.05  cm Final    RA Major Axis 03/14/2024 4.00  cm Final    LV Diastolic Volume 03/14/2024 81.99  mL Final    LV Systolic Volume 03/14/2024 23.10  mL Final    PV Peak D Jung 03/14/2024 0.56  m/s Final    PV Peak S Jung 03/14/2024 0.69  m/s Final    MV Peak A Jung 03/14/2024 0.87  m/s Final    MV stenosis pressure 1/2 time 03/14/2024 53.41  ms Final    TR Max Jung 03/14/2024 1.87  m/s Final    MV Peak E Jung 03/14/2024 0.77  m/s Final    Ao VTI 03/14/2024 31.17  cm Final    Ao peak jung 03/14/2024 1.48  m/s Final    LVOT peak VTI 03/14/2024 22.71  cm Final    LVOT peak jung 03/14/2024 1.12  m/s Final    LVOT diameter 03/14/2024 2.08  cm Final    MV "A" wave duration 03/14/2024 9.42  msec Final    E wave deceleration time 03/14/2024 184.16  msec Final    AV mean gradient 03/14/2024 5  mmHg Final    TAPSE " 03/14/2024 2.59  cm Final    RVDD 03/14/2024 2.86  cm Final    LA size 03/14/2024 2.65  cm Final    Ascending aorta 03/14/2024 2.84  cm Final    STJ 03/14/2024 2.35  cm Final    Sinus 03/14/2024 2.85  cm Final    LVIDs 03/14/2024 2.53  2.1 - 4.0 cm Final    Posterior Wall 03/14/2024 0.66  0.6 - 1.1 cm Final    IVS 03/14/2024 0.70  0.6 - 1.1 cm Final    LVIDd 03/14/2024 4.28  3.5 - 6.0 cm Final    TDI LATERAL 03/14/2024 0.12  m/s Final    LA WIDTH 03/14/2024 3.62  cm Final    TDI SEPTAL 03/14/2024 0.06  m/s Final    LV LATERAL E/E' RATIO 03/14/2024 6.42  m/s Final    LV SEPTAL E/E' RATIO 03/14/2024 12.83  m/s Final    FS 03/14/2024 41  28 - 44 % Final    LA volume 03/14/2024 32.44  cm3 Final    LV mass 03/14/2024 84.63  g Final    ZLVIDD 03/14/2024 -2.44   Final    ZLVIDS 03/14/2024 -2.25   Final    Left Ventricle Relative Wall Thick* 03/14/2024 0.31  cm Final    AV valve area 03/14/2024 2.47  cm² Final    AV Velocity Ratio 03/14/2024 0.76   Final    AV index (prosthetic) 03/14/2024 0.73   Final    MV valve area p 1/2 method 03/14/2024 4.12  cm2 Final    E/A ratio 03/14/2024 0.89   Final    Mean e' 03/14/2024 0.09  m/s Final    Pulm vein S/D ratio 03/14/2024 1.23   Final    LVOT area 03/14/2024 3.4  cm2 Final    LVOT stroke volume 03/14/2024 77.13  cm3 Final    AV peak gradient 03/14/2024 9  mmHg Final    E/E' ratio 03/14/2024 8.56  m/s Final    LV Systolic Volume Index 03/14/2024 12.0  mL/m2 Final    LV Diastolic Volume Index 03/14/2024 42.48  mL/m2 Final    LA Volume Index 03/14/2024 16.8  mL/m2 Final    LV Mass Index 03/14/2024 44  g/m2 Final    Triscuspid Valve Regurgitation Pea* 03/14/2024 14  mmHg Final    LA Volume Index (Mod) 03/14/2024 17.3  mL/m2 Final    YEVGENIY by Velocity Ratio 03/14/2024 2.57  cm² Final    BSA 03/14/2024 1.98  m2 Final    85% Max Predicted HR 03/14/2024 139   Final    Max Predicted HR 03/14/2024 164   Final    OHS CV CPX PATIENT IS MALE 03/14/2024 0.0   Final    [facility] CV CPX PATIENT IS FEMALE  03/14/2024 1.0   Final    HR at rest 03/14/2024 70  bpm Final    Systolic blood pressure 03/14/2024 137  mmHg Final    Diastolic blood pressure 03/14/2024 74  mmHg Final    RPP 03/14/2024 9,590   Final    Exercise duration (min) 03/14/2024 8  minutes Final    Exercise duration (sec) 03/14/2024 0  seconds Final    Peak HR 03/14/2024 181  bpm Final    Peak Systolic BP 03/14/2024 178  mmHg Final    Peak Diatolic BP 03/14/2024 74  mmHg Final    Peak RPP 03/14/2024 32,218   Final    Estimated METs 03/14/2024 13   Final    % Max HR Achieved 03/14/2024 115   Final    1 Minute Recovery HR 03/14/2024 141  bpm Final    TV resting pulmonary artery pressu* 03/14/2024 17  mmHg Final    RV TB RVSP 03/14/2024 5  mmHg Final    Est. RA pres 03/14/2024 3  mmHg Final    Post-Stress Ejection Fraction 03/14/2024 75  % Final       Imaging  No results found.    Assessment  1. Precordial pain  Noncardiac.  Stress test was normal.      Plan and Discussion    Discussed the importance of risk factor modification for prevention of future cardiac events.  Needs to resume regular exercise.    The 10-year ASCVD risk score (Eddy DK, et al., 2019) is: 1.8%    Values used to calculate the score:      Age: 59 years      Sex: Female      Is Non- : No      Diabetic: No      Tobacco smoker: No      Systolic Blood Pressure: 125 mmHg      Is BP treated: No      HDL Cholesterol: 76 mg/dL      Total Cholesterol: 206 mg/dL     Follow Up  Follow up if symptoms worsen or fail to improve.      Des Lopez MD

## 2024-08-27 ENCOUNTER — TELEPHONE (OUTPATIENT)
Dept: DERMATOLOGY | Facility: CLINIC | Age: 59
End: 2024-08-27
Payer: COMMERCIAL

## 2024-09-10 ENCOUNTER — TELEPHONE (OUTPATIENT)
Dept: DERMATOLOGY | Facility: CLINIC | Age: 59
End: 2024-09-10
Payer: COMMERCIAL

## 2024-09-18 ENCOUNTER — OFFICE VISIT (OUTPATIENT)
Dept: DERMATOLOGY | Facility: CLINIC | Age: 59
End: 2024-09-18
Payer: COMMERCIAL

## 2024-09-18 DIAGNOSIS — L81.4 LENTIGO: ICD-10-CM

## 2024-09-18 DIAGNOSIS — L82.1 SEBORRHEIC KERATOSES: Primary | ICD-10-CM

## 2024-09-18 NOTE — PROGRESS NOTES
Subjective:      Patient ID:  Janae Tobias is a 59 y.o. female with no significant past dermatological history who presents for   Chief Complaint   Patient presents with    Lesion     HPI  Patient presents for a several month history of a brown spot on her right shoulder that has been getting thicker and darker. She is worried that it is a mole. She has never seen a dermatologist before in the past.     No known hx of skin cancer.  Hx tanning bed use.     Review of Systems   Skin:  Positive for activity-related sunscreen use. Negative for itching and rash.       Objective:   Physical Exam   Constitutional: She appears well-developed and well-nourished. No distress.   Neurological: She is alert and oriented to person, place, and time. She is not disoriented.   Psychiatric: She has a normal mood and affect.   Skin:   Areas Examined (abnormalities noted in diagram):   Neck Inspection Performed  Back Inspection Performed            Diagram Legend     Erythematous scaling macule/papule c/w actinic keratosis       Vascular papule c/w angioma      Pigmented verrucoid papule/plaque c/w seborrheic keratosis      Yellow umbilicated papule c/w sebaceous hyperplasia      Irregularly shaped tan macule c/w lentigo     1-2 mm smooth white papules consistent with Milia      Movable subcutaneous cyst with punctum c/w epidermal inclusion cyst      Subcutaneous movable cyst c/w pilar cyst      Firm pink to brown papule c/w dermatofibroma      Pedunculated fleshy papule(s) c/w skin tag(s)      Evenly pigmented macule c/w junctional nevus     Mildly variegated pigmented, slightly irregular-bordered macule c/w mildly atypical nevus      Flesh colored to evenly pigmented papule c/w intradermal nevus       Pink pearly papule/plaque c/w basal cell carcinoma      Erythematous hyperkeratotic cursted plaque c/w SCC      Surgical scar with no sign of skin cancer recurrence      Open and closed comedones      Inflammatory papules and pustules       Verrucoid papule consistent consistent with wart     Erythematous eczematous patches and plaques     Dystrophic onycholytic nail with subungual debris c/w onychomycosis     Umbilicated papule    Erythematous-base heme-crusted tan verrucoid plaque consistent with inflamed seborrheic keratosis     Erythematous Silvery Scaling Plaque c/w Psoriasis     See annotation      Assessment / Plan:        Seborrheic keratoses  Lesion of patient's concern on right shoulder and upper back consistent with seborrheic keratoses. Discussed benign nature of lesions.  - reassurance provided  - discussed can consider cosmetic removal in the future if they become bothersome to patietn    Lentigo  Noted throughout forearms and back. Discussed patient at higher risk for skin cancer given skin type and hx of significant sun exposure.  - recommend patient schedule FBSE within next year  - counseled patient to return sooner if any growing, changing, non-healing, or painful lesions         Jazmyn Lai MD  [facility] PGY-III

## 2025-01-09 ENCOUNTER — TELEPHONE (OUTPATIENT)
Dept: ORTHOPEDICS | Facility: CLINIC | Age: 60
End: 2025-01-09
Payer: COMMERCIAL

## 2025-01-09 DIAGNOSIS — M51.362 DEGENERATION OF INTERVERTEBRAL DISC OF LUMBAR REGION WITH DISCOGENIC BACK PAIN AND LOWER EXTREMITY PAIN: Primary | ICD-10-CM

## 2025-01-09 NOTE — PROGRESS NOTES
DATE: 1/10/2025  PATIENT: Janae Tobias    Supervising Physician: Miky Beckman M.D.    CHIEF COMPLAINT: back pain    HISTORY:  Janae Tobias is a 59 y.o. female here for initial evaluation of left low back pain (Back - 3, Leg - 0).  The pain in the left low back is what bothers her most.  The pain has been present for about 1 week. The patient describes the pain as aching.  The pain is worse with squatting and improved by sitting and standing up straight. There is no associated numbness and tingling. There is no subjective weakness. Prior treatments have included Advil and home exercises, but no PT, DONI or surgery.  The patient denies myelopathic symptoms such as handwriting changes or difficulty with buttons/coins/keys. Denies perineal paresthesias, bowel/bladder dysfunction.    PAST MEDICAL/SURGICAL HISTORY:  Past Medical History:   Diagnosis Date    Hypothyroidism      Past Surgical History:   Procedure Laterality Date    TUBAL LIGATION      uterine ablation  2012       Medications:   No current outpatient medications on file prior to visit.     No current facility-administered medications on file prior to visit.       Social History:   Social History     Socioeconomic History    Marital status:    Tobacco Use    Smoking status: Never    Smokeless tobacco: Never   Substance and Sexual Activity    Alcohol use: Not Currently    Drug use: Never     Social Drivers of Health     Financial Resource Strain: Low Risk  (5/20/2024)    Overall Financial Resource Strain (CARDIA)     Difficulty of Paying Living Expenses: Not hard at all   Food Insecurity: No Food Insecurity (5/20/2024)    Hunger Vital Sign     Worried About Running Out of Food in the Last Year: Never true     Ran Out of Food in the Last Year: Never true   Transportation Needs: No Transportation Needs (3/14/2024)    PRAPARE - Transportation     Lack of Transportation (Medical): No     Lack of Transportation (Non-Medical): No   Physical Activity:  "Inactive (5/20/2024)    Exercise Vital Sign     Days of Exercise per Week: 0 days     Minutes of Exercise per Session: 0 min   Stress: No Stress Concern Present (5/20/2024)    [employer] - Occupational Stress Questionnaire     Feeling of Stress : Not at all   Recent Concern: Stress - Stress Concern Present (3/14/2024)    [employer] - Occupational Stress Questionnaire     Feeling of Stress : To some extent   Housing Stability: Unknown (3/14/2024)    Housing Stability Vital Sign     Unable to Pay for Housing in the Last Year: No     Unstable Housing in the Last Year: No       REVIEW OF SYSTEMS:  Constitution: Negative. Negative for chills, fever and night sweats.   Cardiovascular: Negative for chest pain and syncope.   Respiratory: Negative for cough and shortness of breath.   Gastrointestinal: See HPI. Negative for nausea/vomiting. Negative for abdominal pain.  Genitourinary: See HPI. Negative for discoloration or dysuria.  Skin: Negative for dry skin, itching and rash.   Hematologic/Lymphatic: Negative for bleeding problem. Does not bruise/bleed easily.   Musculoskeletal: Negative for falls and muscle weakness.   Neurological: See HPI. No seizures.   Endocrine: Negative for polydipsia, polyphagia and polyuria.   Allergic/Immunologic: Negative for hives and persistent infections.     EXAM:  Ht 5' 6" (1.676 m)   Wt 85.6 kg (188 lb 11.4 oz)   BMI 30.46 kg/m²     General: The patient is a 59 y.o. female in no apparent distress, the patient is oriented to person, place and time.  Psych: Normal mood and affect  HEENT: Vision grossly intact, hearing intact to the spoken word.  Lungs: Respirations unlabored.  Gait: antalgic station and gait, no difficulty with toe or heel walk.   Skin: Dorsal lumbar skin negative for rashes, lesions, hairy patches and surgical scars. There is moderate left lumbar tenderness to palpation.  Range of motion: Lumbar range of motion " "is acceptable.  Spinal Balance: Global saggital and coronal spinal balance acceptable, not significant for scoliosis and kyphosis.  Musculoskeletal: No pain with the range of motion of the bilateral hips. No trochanteric tenderness to palpation.  Vascular: Bilateral lower extremities warm and well perfused, dorsalis pedis pulses 2+ bilaterally.  Neurological: Normal strength and tone in all major motor groups in the bilateral lower extremities. Normal sensation to light touch in the L2-S1 dermatomes bilaterally.  Deep tendon reflexes symmetric 2+ in the bilateral lower extremities.  Negative Babinski bilaterally. Straight leg raise negative bilaterally.    IMAGING:   Today I personally reviewed AP, Lat and Flex/Ex  upright L-spine films that demonstrate trace anterolisthesis of L4 on L5.       Body mass index is 30.46 kg/m².    No results found for: "HGBA1C"        ASSESSMENT/PLAN:    Janae was seen today for low-back pain and back pain.    Diagnoses and all orders for this visit:    Spondylolisthesis of lumbar region  -     methylPREDNISolone (MEDROL DOSEPACK) 4 mg tablet; use as directed  -     cyclobenzaprine (FLEXERIL) 5 MG tablet; Take 1-2 tablets (5-10 mg total) by mouth 3 (three) times daily as needed for Muscle spasms.      Today we discussed at length all of the different treatment options including anti-inflammatories, acetaminophen, rest, ice, heat, physical therapy including strengthening and stretching exercises, home exercises, ROM, aerobic conditioning, aqua therapy, other modalities including ultrasound, massage, and dry needling, epidural steroid injections and finally surgical intervention.  medications sent to the pharmacy. Follow up in 2 weeks virtually.   I provided the patient with a home exercise program. It is the [facility] spine conditioning program. Exercises include head rolls, kneeling back extension, sitting rotation stretch, modified seated side straddle, knee to chest, bird dog, plank, " modified seated plank, hip bridges, abdominal bracing, and abdominal crunch. Pt will complete each exercise 5 times daily for 6-8 weeks.

## 2025-01-09 NOTE — TELEPHONE ENCOUNTER
Spoke to patient regarding x-ray. Patient is aware of x-ray at the [facility] for 8:30 am on 01/10/2025. Patient stated thank you. Thanks.

## 2025-01-10 ENCOUNTER — HOSPITAL ENCOUNTER (OUTPATIENT)
Dept: RADIOLOGY | Facility: HOSPITAL | Age: 60
Discharge: HOME | End: 2025-01-10
Attending: REGISTERED NURSE
Payer: COMMERCIAL

## 2025-01-10 ENCOUNTER — OFFICE VISIT (OUTPATIENT)
Dept: ORTHOPEDICS | Facility: CLINIC | Age: 60
End: 2025-01-10
Payer: COMMERCIAL

## 2025-01-10 VITALS — HEIGHT: 66 IN | BODY MASS INDEX: 30.33 KG/M2 | WEIGHT: 188.69 LBS

## 2025-01-10 DIAGNOSIS — M51.362 DEGENERATION OF INTERVERTEBRAL DISC OF LUMBAR REGION WITH DISCOGENIC BACK PAIN AND LOWER EXTREMITY PAIN: ICD-10-CM

## 2025-01-10 DIAGNOSIS — M43.16 SPONDYLOLISTHESIS OF LUMBAR REGION: Primary | ICD-10-CM

## 2025-01-10 PROCEDURE — 99999 PR PBB SHADOW E&M-EST. PATIENT-LVL III: CPT | Mod: PBBFAC,,, | Performed by: REGISTERED NURSE

## 2025-01-10 PROCEDURE — 72110 X-RAY EXAM L-2 SPINE 4/>VWS: CPT | Mod: 26,,, | Performed by: RADIOLOGY

## 2025-01-10 PROCEDURE — 72110 X-RAY EXAM L-2 SPINE 4/>VWS: CPT | Mod: TC

## 2025-01-10 RX ORDER — METHYLPREDNISOLONE 4 MG/1
TABLET ORAL
Qty: 1 EACH | Refills: 0 | Status: SHIPPED | OUTPATIENT
Start: 2025-01-10 | End: 2025-01-31

## 2025-01-10 RX ORDER — CYCLOBENZAPRINE HCL 5 MG
5-10 TABLET ORAL 3 TIMES DAILY PRN
Qty: 60 TABLET | Refills: 0 | Status: SHIPPED | OUTPATIENT
Start: 2025-01-10 | End: 2025-01-20

## 2025-04-29 ENCOUNTER — CLINICAL SUPPORT (OUTPATIENT)
Dept: OBSTETRICS AND GYNECOLOGY | Facility: CLINIC | Age: 60
End: 2025-04-29
Payer: COMMERCIAL

## 2025-04-29 DIAGNOSIS — N95.1 MENOPAUSAL SYMPTOMS: Primary | ICD-10-CM

## 2025-04-30 NOTE — PROGRESS NOTES
Personal Information    Full Name: Janae Tobias  1965    PCP- Dr. Joe Bender    Medical History    Do you have a chronic medical condition? (e.g., diabetes, hypertension, thyroid issues)   If yes, please specify:   Yes - Hypothyroidism     2.   Do you have a history of hormone- related conditions? (e.g., endometriosis, breast cancer)   If yes, please explain:   No    3.   Have you previously or are you currently taking hormone replacement therapy?   If yes, please list:HRT 3-4 yrs ago.    Yes - She has taken progesterone and received testosterone ans estrogen injections     Menstrual History    At what age did you begin menstruating?   17    2.   Have you experienced any changes in your menstrual cycle in the last year?   If yes, please describe:   Yes - Ablation 2012     3.   When was your last menstrual period or age of last period?   N/A    4.   Have you had a hysterectomy?   If yes, at what age?  No    Symptoms Assesments      Are you experiencing any of the following symptoms:  Hot Flashes: Yes   Night Sweats: Yes   Vaginal Dryness or Pain with [place]: Yes   Mood Swings: Yes   Anxiety or Depression: No   Sleep Disturbances: Yes   Fatigue: Yes   Weight Gain: Yes   Joint or Muscle Pain: Yes   Memory Issues or Brain Fog: Yes   Decreased Interest in Sex (Low Libido): Yes     Lifestyle Factors    How would you describe your diet?  Balanced    2.   How often do you exercise?   Regularly    3.   Do you smoke or consume alcohol?   If yes, please specify frequency:   Yes - Social drinker- Does not smoke     4.   How would you rate your stress levels?   Moderate-High     Family History    Is there a family history of menopause-related conditions? (e.g., osteoporosis, breast cancer)  If yes, please specify  Yes - Paternal Gm Breast Cancer & Maternal GM lung cancer     2.   Is there a family history of heart disease?   If yes, please specify   Yes - Mother  CHF  ---------------------------------------------------------------  Mammogram 12/2023  Annual/pap 12/2023  -------------------------------------------------------------------  Spoke with patient for a total of 30 minutes during virtual visit.  Patient was guided through expectations and treatment options.     Questions answered. Encouraged to send message or call office with any questions/concerns. Verbalized understanding.       Summer

## 2025-06-13 ENCOUNTER — TELEPHONE (OUTPATIENT)
Dept: PRIMARY CARE CLINIC | Facility: CLINIC | Age: 60
End: 2025-06-13

## 2025-06-13 ENCOUNTER — OFFICE VISIT (OUTPATIENT)
Dept: PRIMARY CARE CLINIC | Facility: CLINIC | Age: 60
End: 2025-06-13
Payer: COMMERCIAL

## 2025-06-13 DIAGNOSIS — B37.31 CANDIDA VAGINITIS: ICD-10-CM

## 2025-06-13 DIAGNOSIS — N30.00 ACUTE CYSTITIS WITHOUT HEMATURIA: Primary | ICD-10-CM

## 2025-06-13 PROCEDURE — 98005 SYNCH AUDIO-VIDEO EST LOW 20: CPT | Mod: 95,,, | Performed by: INTERNAL MEDICINE

## 2025-06-13 RX ORDER — CIPROFLOXACIN 500 MG/1
500 TABLET, FILM COATED ORAL 2 TIMES DAILY
Qty: 14 TABLET | Refills: 1 | Status: SHIPPED | OUTPATIENT
Start: 2025-06-13

## 2025-06-13 RX ORDER — FLUCONAZOLE 150 MG/1
150 TABLET ORAL ONCE
Qty: 1 TABLET | Refills: 0 | Status: SHIPPED | OUTPATIENT
Start: 2025-06-13 | End: 2025-06-13

## 2025-06-13 RX ORDER — PHENAZOPYRIDINE HYDROCHLORIDE 100 MG/1
100 TABLET, FILM COATED ORAL 3 TIMES DAILY PRN
Qty: 15 TABLET | Refills: 0 | Status: SHIPPED | OUTPATIENT
Start: 2025-06-13 | End: 2025-06-23

## 2025-06-13 NOTE — TELEPHONE ENCOUNTER
Copied from CRM #[id number]. Topic: General Inquiry - Return Call  >> Jun 13, 2025  3:36 PM Rosalina wrote:  Patient is returning a phone call.    Who left a message for the patient: Paty    Does patient know what this is regarding:  ?    Would you like a call back, or a response through your [facility] portal?:   Call back    Comments: Missed your call, please call her back. Thanks.

## 2025-06-16 NOTE — PROGRESS NOTES
The patient location is: [place]  The chief complaint leading to consultation is: possible UTI    Visit type: audiovisual    Face to Face time with patient: 15  Subjective:       Patient ID: Janae Tobias is a 60 y.o. female.    Chief Complaint: No chief complaint on file.    HPI  Pt with h/o hypothyroidsm her visit today with c/o she felt pressure at the end of micturition she try increase po fluid intake but symptoms not ging away in last 2 days more pressure in lower abd and rt flank dyscomfort and strong smell urine no blood no fever chill no n/v no h/o kidney stone  Review of Systems   Constitutional:  Negative for unexpected weight change.   Respiratory:  Negative for shortness of breath.    Cardiovascular:  Negative for chest pain.   Gastrointestinal:  Negative for abdominal pain.   Musculoskeletal:  Negative for arthralgias and back pain.   Psychiatric/Behavioral:  Negative for dysphoric mood.        Objective:      Physical Exam  Constitutional:       General: She is not in acute distress.     Appearance: Normal appearance.   HENT:      Head: Normocephalic and atraumatic.      Mouth/Throat:      Pharynx: No oropharyngeal exudate or posterior oropharyngeal erythema.   Eyes:      Extraocular Movements: Extraocular movements intact.   Pulmonary:      Effort: Pulmonary effort is normal.   Abdominal:      Tenderness: There is no abdominal tenderness.   Neurological:      Mental Status: She is alert and oriented to person, place, and time.   Psychiatric:         Mood and Affect: Mood normal.         Thought Content: Thought content normal.         Judgment: Judgment normal.         Assessment:       1. Acute cystitis without hematuria    2. Candida vaginitis        Plan:       Acute cystitis without hematuria  -     ciprofloxacin HCl (CIPRO) 500 MG tablet; Take 1 tablet (500 mg total) by mouth 2 (two) times daily.  Dispense: 14 tablet; Refill: 1  -     fluconazole (DIFLUCAN) 150 MG Tab; Take 1 tablet (150 mg  total) by mouth once. for 1 dose  Dispense: 1 tablet; Refill: 0    Candida vaginitis  Comments:  pt request diflucan since get yeast infection everytime on po abx  Orders:  -     phenazopyridine (PYRIDIUM) 100 MG tablet; Take 1 tablet (100 mg total) by mouth 3 (three) times daily as needed for Pain.  Dispense: 15 tablet; Refill: 0        Medication List with Changes/Refills   New Medications    CIPROFLOXACIN HCL (CIPRO) 500 MG TABLET    Take 1 tablet (500 mg total) by mouth 2 (two) times daily.    PHENAZOPYRIDINE (PYRIDIUM) 100 MG TABLET    Take 1 tablet (100 mg total) by mouth 3 (three) times daily as needed for Pain.     minutes of total time spent on the encounter, which includes face to face time and non-face to face time preparing to see the patient (eg, review of tests), Obtaining and/or reviewing separately obtained history, Documenting clinical information in the electronic or other health record, Independently interpreting results (not separately reported) and communicating results to the patient/family/caregiver, or Care coordination (not separately reported).         Each patient to whom he or she provides medical services by telemedicine is:  (1) informed of the relationship between the physician and patient and the respective role of any other health care provider with respect to management of the patient; and (2) notified that he or she may decline to receive medical services by telemedicine and may withdraw from such care at any time.    Notes:

## 2025-06-17 ENCOUNTER — OFFICE VISIT (OUTPATIENT)
Dept: OBSTETRICS AND GYNECOLOGY | Facility: CLINIC | Age: 60
End: 2025-06-17
Payer: COMMERCIAL

## 2025-06-17 VITALS
SYSTOLIC BLOOD PRESSURE: 131 MMHG | DIASTOLIC BLOOD PRESSURE: 87 MMHG | WEIGHT: 186.63 LBS | HEART RATE: 89 BPM | BODY MASS INDEX: 30.12 KG/M2

## 2025-06-17 DIAGNOSIS — Z01.419 WELL WOMAN EXAM WITH ROUTINE GYNECOLOGICAL EXAM: Primary | ICD-10-CM

## 2025-06-17 DIAGNOSIS — Z12.4 CERVICAL CANCER SCREENING: ICD-10-CM

## 2025-06-17 DIAGNOSIS — N63.20 MASS OF LEFT BREAST, UNSPECIFIED QUADRANT: ICD-10-CM

## 2025-06-17 DIAGNOSIS — Z78.0 POSTMENOPAUSAL: ICD-10-CM

## 2025-06-17 DIAGNOSIS — R68.82 LOW LIBIDO: ICD-10-CM

## 2025-06-17 DIAGNOSIS — Z12.31 SCREENING MAMMOGRAM FOR BREAST CANCER: ICD-10-CM

## 2025-06-17 PROCEDURE — 88175 CYTOPATH C/V AUTO FLUID REDO: CPT | Mod: TC | Performed by: OBSTETRICS & GYNECOLOGY

## 2025-06-17 PROCEDURE — 99396 PREV VISIT EST AGE 40-64: CPT | Mod: S$GLB,,, | Performed by: OBSTETRICS & GYNECOLOGY

## 2025-06-17 PROCEDURE — 99999 PR PBB SHADOW E&M-EST. PATIENT-LVL III: CPT | Mod: PBBFAC,,, | Performed by: OBSTETRICS & GYNECOLOGY

## 2025-06-17 NOTE — PROGRESS NOTES
CC: Well woman exam    Janae Tobias is a 60 y.o. female  presents for well woman exam.  LMP: No LMP recorded. Patient is postmenopausal.. Patient has a hormone consult scheduled for 2025.  She wants to know if she should have her hormone levels checked.  She does have low libido and a history of elevated DHEA in  256 (normal is less than 200).  Patient was previously on HRT but stopped and would like to restart    OB History          4    Para   4    Term   4            AB        Living             SAB        IAB        Ectopic        Multiple        Live Births                   Gynecology   Past Medical History:   Diagnosis Date    Hypothyroidism      Past Surgical History:   Procedure Laterality Date    TUBAL LIGATION      uterine ablation       Social History[1]  Family History   Problem Relation Name Age of Onset    Heart disease Mother Charlotte     Arthritis Mother Charlotte     Hypertension Mother Charlotte     Kidney disease Mother Charlotte     Heart disease Father Armand     Hypertension Father Armand     Kidney disease Father Armand     Breast cancer Paternal Grandmother Leanne     Cancer Paternal Grandmother Leanne     Arthritis Maternal Grandmother CHARLOTTE MAY     Cancer Maternal Grandmother CHARLOTTE MAY          /87 (Patient Position: Sitting)   Pulse 89   Wt 84.6 kg (186 lb 9.6 oz)   BMI 30.12 kg/m²       ROS  Review of Systems   Constitutional: Negative.    Gastrointestinal: Negative.    Genitourinary: Negative.    Psychiatric/Behavioral: Negative.            PHYSICAL EXAM:  Physical Exam  Vitals reviewed.   Constitutional:       General: She is not in acute distress.     Appearance: She is well-developed and well-groomed.   Chest:   Breasts:     Breasts are symmetrical.      Right: Normal. No inverted nipple, mass, nipple discharge, skin change or tenderness.      Left: Mass present. No inverted nipple, nipple discharge, skin change or tenderness.          Comments: 3x3cm  mobile mass in inner L breast  Abdominal:      General: Abdomen is flat.      Palpations: Abdomen is soft.      Tenderness: There is no abdominal tenderness. There is no guarding or rebound.   Genitourinary:     General: Normal vulva.      Exam position: Lithotomy position.      Labia:         Right: No rash, tenderness or lesion.         Left: No rash, tenderness or lesion.       Urethra: No prolapse, urethral pain, urethral swelling or urethral lesion.      Vagina: Normal.      Cervix: Normal.      Uterus: Normal. Not enlarged and not tender.       Adnexa: Right adnexa normal and left adnexa normal.        Right: No mass, tenderness or fullness.          Left: No mass, tenderness or fullness.     Lymphadenopathy:      Upper Body:      Right upper body: No axillary adenopathy.      Left upper body: No axillary adenopathy.   Neurological:      Mental Status: She is alert.            Well woman exam with routine gynecological exam    Screening mammogram for breast cancer  -     Cancel: Mammo Digital Screening Bilat w/ Genaro (XPD); Future; Expected date: 06/17/2025    Cervical cancer screening  -     Liquid-Based Pap Smear, Screening    Low libido  -     Cancel: TESTOSTERONE PANEL; Future; Expected date: 06/17/2025  -     Cancel: DHEA-Sulfate; Future; Expected date: 06/17/2025  -     TESTOSTERONE PANEL; Future; Expected date: 06/17/2025  -     DHEA-Sulfate; Future; Expected date: 06/17/2025    Postmenopausal  -     Cancel: Lipid Panel; Future; Expected date: 06/17/2025  -     Lipid Panel; Future; Expected date: 06/17/2025    Mass of left breast, unspecified quadrant  -     US Breast Left Limited; Future; Expected date: 06/17/2025  -     Mammo Digital Diagnostic Bilat with Genaro (XPD); Future; Expected date: 06/17/2025      Pap and HPV co-testing done  Pelvicexam WNL  SBE education completed  L breast mass on exam  Diagnostic MMG ordered  Patient states that she has a history of brownish discharge 2 years ago.  MMG/US  and MRI were unremarkable.  Seen by Breast surgeon and states that it is likely a non dilated hemorrhagic duct  Recommend scheduling colonoscopy.  Reviewed guidelines  Hormone consult scheduled for 7/8/25  DHEA elevated but can vary by age and other factors      Patient was counseled today on A.C.S. Pap guidelines and recommendations for yearly pelvic exams, mammograms and monthly self breast exams; to see her PCP for other health maintenance.     No follow-ups on file.         [1]   Social History  Socioeconomic History    Marital status:    Tobacco Use    Smoking status: Never     Passive exposure: Never    Smokeless tobacco: Never   Substance and Sexual Activity    Alcohol use: Not Currently    Drug use: Never    Sexual activity: Yes     Social Drivers of Health     Financial Resource Strain: Low Risk  (5/20/2024)    Overall Financial Resource Strain (CARDIA)     Difficulty of Paying Living Expenses: Not hard at all   Food Insecurity: No Food Insecurity (5/20/2024)    Hunger Vital Sign     Worried About Running Out of Food in the Last Year: Never true     Ran Out of Food in the Last Year: Never true   Transportation Needs: No Transportation Needs (3/14/2024)    PRAPARE - Transportation     Lack of Transportation (Medical): No     Lack of Transportation (Non-Medical): No   Physical Activity: Inactive (5/20/2024)    Exercise Vital Sign     Days of Exercise per Week: 0 days     Minutes of Exercise per Session: 0 min   Stress: No Stress Concern Present (5/20/2024)    [employer] [place] [employer] - Occupational Stress Questionnaire     Feeling of Stress : Not at all   Recent Concern: Stress - Stress Concern Present (3/14/2024)    [employer] [place] [employer] - Occupational Stress Questionnaire     Feeling of Stress : To some extent   Housing Stability: Unknown (3/14/2024)    Housing Stability Vital Sign     Unable to Pay for Housing in the Last Year: No     Unstable Housing in the Last  Year: No

## 2025-06-20 LAB
INSULIN SERPL-ACNC: NORMAL U[IU]/ML
LAB AP BETHESDA CATEGORY: NORMAL
LAB AP CLINICAL FINDINGS: NORMAL
LAB AP CONTRACEPTIVES: NORMAL
LAB AP OCHS PAP SPECIMEN ADEQUACY: NORMAL
LAB AP OHS PAP INTERPRETATION: NORMAL
LAB AP PAP DISCLAIMER COMMENTS: NORMAL
LAB AP PAP ESTROGEN REPLACEMENT THERAPY: NORMAL
LAB AP PAP PMP: NORMAL
LAB AP PAP PREVIOUS BX: NORMAL
LAB AP PAP PRIOR TREATMENT: NORMAL
LAB AP PERFORMING LOCATION(S): NORMAL

## 2025-06-24 ENCOUNTER — PATIENT MESSAGE (OUTPATIENT)
Dept: PRIMARY CARE CLINIC | Facility: CLINIC | Age: 60
End: 2025-06-24
Payer: COMMERCIAL

## 2025-06-24 ENCOUNTER — PATIENT MESSAGE (OUTPATIENT)
Dept: OBSTETRICS AND GYNECOLOGY | Facility: CLINIC | Age: 60
End: 2025-06-24
Payer: COMMERCIAL

## 2025-06-24 DIAGNOSIS — N63.21 MASS OF UPPER OUTER QUADRANT OF LEFT BREAST: Primary | ICD-10-CM

## 2025-06-27 ENCOUNTER — RESULTS FOLLOW-UP (OUTPATIENT)
Dept: OBSTETRICS AND GYNECOLOGY | Facility: CLINIC | Age: 60
End: 2025-06-27

## 2025-07-08 ENCOUNTER — OFFICE VISIT (OUTPATIENT)
Dept: OBSTETRICS AND GYNECOLOGY | Facility: CLINIC | Age: 60
End: 2025-07-08
Payer: COMMERCIAL

## 2025-07-08 VITALS
HEIGHT: 66 IN | DIASTOLIC BLOOD PRESSURE: 94 MMHG | WEIGHT: 186.38 LBS | HEART RATE: 90 BPM | BODY MASS INDEX: 29.95 KG/M2 | SYSTOLIC BLOOD PRESSURE: 136 MMHG

## 2025-07-08 DIAGNOSIS — R53.83 FATIGUE, UNSPECIFIED TYPE: ICD-10-CM

## 2025-07-08 DIAGNOSIS — R68.82 LOW LIBIDO: ICD-10-CM

## 2025-07-08 DIAGNOSIS — N95.1 INSOMNIA ASSOCIATED WITH MENOPAUSE: ICD-10-CM

## 2025-07-08 DIAGNOSIS — N95.1 VAGINAL DRYNESS, MENOPAUSAL: ICD-10-CM

## 2025-07-08 DIAGNOSIS — N95.1 VASOMOTOR SYMPTOMS DUE TO MENOPAUSE: Primary | ICD-10-CM

## 2025-07-08 PROCEDURE — 99999 PR PBB SHADOW E&M-EST. PATIENT-LVL III: CPT | Mod: PBBFAC,,, | Performed by: OBSTETRICS & GYNECOLOGY

## 2025-07-08 PROCEDURE — 99214 OFFICE O/P EST MOD 30 MIN: CPT | Mod: S$GLB,,, | Performed by: OBSTETRICS & GYNECOLOGY

## 2025-07-08 RX ORDER — ESTRADIOL 0.04 MG/D
1 FILM, EXTENDED RELEASE TRANSDERMAL
Qty: 8 PATCH | Refills: 12 | Status: SHIPPED | OUTPATIENT
Start: 2025-07-10

## 2025-07-08 RX ORDER — ESTRADIOL 0.1 MG/G
1 CREAM VAGINAL DAILY
Qty: 42 G | Refills: 4 | Status: SHIPPED | OUTPATIENT
Start: 2025-07-08

## 2025-07-08 RX ORDER — PROGESTERONE 100 MG/1
100 CAPSULE ORAL NIGHTLY
Qty: 90 CAPSULE | Refills: 3 | Status: SHIPPED | OUTPATIENT
Start: 2025-07-08

## 2025-07-08 NOTE — PROGRESS NOTES
"[facility] Hormone Consult Preparation Sheet    Patient Name: Janae Tobias 60 y.o. postmenopausal woman who presents for a hormone consult.  Patient was previous on testosterone/estrogen injections and prometrium 3-4 years ago but stopped due to fear.  She states that her menopausal symptoms were relieved and she felt great.  She now has hot flashes and night sweats.  She states the night sweats due keep her up at night.  She has vaginal dryness and pian with intercourse.  She has never used vaginal estrogen in the past.  She does use lubricants with moderate relief.  She also has decreased desire.  She has sleep disturbances.  She goes to bed at 11PM ad wakes up at 6:15AM.  She has trouble falling.  She has trouble staying asleep due to hip pain.  She has not had a workup for her hip pain.  She states that it is intermittent and improves with movement.  She denies snoring and does not use electronicdevices before or in bed.  She reports daytime fatigue.  She also reports brain fog.  She has weight gain too.  She states that she was previously able to lose weight after starting HRT.      Provider: Francesca Goddard  CC and Symptoms:   Chief Complaint   Patient presents with    Hormone Consultation       Patient History:  Problem List[1]    Patient Medications:  Current Outpatient Medications on File Prior to Visit   Medication Sig    ciprofloxacin HCl (CIPRO) 500 MG tablet Take 1 tablet (500 mg total) by mouth 2 (two) times daily. (Patient not taking: Reported on 7/8/2025)     No current facility-administered medications on file prior to visit.       Patient Imaging and Procedures  LMP: No LMP recorded. Patient is postmenopausal.  PAP: 6/20/2025  HPV: No results found for: "HPV"  Mammo: 2021 WNL  Breast MRI for L nipple discharge benign findings with negative MMG/US  Colonoscopy:    BMI: Body mass index is 30.09 kg/m².    Recent Labs:  Estradiol: No results found for: "ESTRADIOL"  Testosterone: " "  Lab Results   Component Value Date    TESTOSTERONE 2.7 06/11/2021     FSH: No results found for: "FSH"  CBC:   Lab Results   Component Value Date    WBC 5.08 03/13/2024    HGB 13.5 03/13/2024    HCT 41.5 03/13/2024    MCV 87 03/13/2024     03/13/2024       CMP:  Sodium   Date Value Ref Range Status   03/13/2024 140 136 - 145 mmol/L Final     Potassium   Date Value Ref Range Status   03/13/2024 4.4 3.5 - 5.1 mmol/L Final     Chloride   Date Value Ref Range Status   03/13/2024 107 95 - 110 mmol/L Final     CO2   Date Value Ref Range Status   03/13/2024 24 23 - 29 mmol/L Final     Glucose   Date Value Ref Range Status   03/13/2024 106 70 - 110 mg/dL Final     BUN   Date Value Ref Range Status   03/13/2024 9 6 - 20 mg/dL Final     Creatinine   Date Value Ref Range Status   03/13/2024 0.7 0.5 - 1.4 mg/dL Final     Calcium   Date Value Ref Range Status   03/13/2024 9.8 8.7 - 10.5 mg/dL Final     Total Protein   Date Value Ref Range Status   03/13/2024 7.8 6.0 - 8.4 g/dL Final     Albumin   Date Value Ref Range Status   03/13/2024 4.0 3.5 - 5.2 g/dL Final     Total Bilirubin   Date Value Ref Range Status   03/13/2024 0.3 0.1 - 1.0 mg/dL Final     Comment:     For infants and newborns, interpretation of results should be based  on gestational age, weight and in agreement with clinical  observations.    Premature Infant recommended reference ranges:  Up to 24 hours.............<8.0 mg/dL  Up to 48 hours............<12.0 mg/dL  3-5 days..................<15.0 mg/dL  6-29 days.................<15.0 mg/dL       Alkaline Phosphatase   Date Value Ref Range Status   03/13/2024 80 55 - 135 U/L Final     AST   Date Value Ref Range Status   03/13/2024 18 10 - 40 U/L Final     ALT   Date Value Ref Range Status   03/13/2024 15 10 - 44 U/L Final     Anion Gap   Date Value Ref Range Status   03/13/2024 9 8 - 16 mmol/L Final     eGFR if    Date Value Ref Range Status   06/11/2021 116 > OR = 60 mL/min/1.73m2 Final " "    eGFR if non    Date Value Ref Range Status   06/11/2021 100 > OR = 60 mL/min/1.73m2 Final     Lipids:   Cholesterol   Date Value Ref Range Status   06/11/2021 206 (H) <200 mg/dL Final     Triglycerides   Date Value Ref Range Status   06/11/2021 61 <150 mg/dL Final     HDL   Date Value Ref Range Status   06/11/2021 76 > OR = 50 mg/dL Final     LDL Cholesterol   Date Value Ref Range Status   06/11/2021 115 (H) mg/dL (calc) Final     Comment:     Reference range: <100     Desirable range <100 mg/dL for primary prevention;    <70 mg/dL for patients with CHD or diabetic patients   with > or = 2 CHD risk factors.     LDL-C is now calculated using the Lyla   calculation, which is a validated novel method providing   better accuracy than the Friedewald equation in the   estimation of LDL-C.   Ross JAQUEZ et al. MIKI. 2013;310(19): 4283-5533   (http://education.Star[employer].com.[employer] Speech/faq/GHX832)       HDL/Cholesterol Ratio   Date Value Ref Range Status   06/11/2021 2.7 <5.0 (calc) Final     Non HDL Chol. (LDL+VLDL)   Date Value Ref Range Status   06/11/2021 130 (H) <130 mg/dL (calc) Final     Comment:     For patients with diabetes plus 1 major ASCVD risk   factor, treating to a non-HDL-C goal of <100 mg/dL   (LDL-C of <70 mg/dL) is considered a therapeutic   option.       A1C: No results found for: "LABA1C", "HGBA1C"    Assessment & Plan    Vasomotor Symptoms   ? Discussed estrogen therapy for hot flashes: delivery methods (oral, transdermal, topical), risks (e.g., VTE, breast cancer, stroke), and benefits (relief of vasomotor symptoms, bone health).   ASCVD risk 2.4%  ? Rx: Estradiol 0.0375mg twice a week  tailored to patient preference and risk profile.   ? Reviewed hysterectomy status to determine need for progesterone.   ? Discussed progesterone's dual role in maintaining uterine lining (if uterus intact) and addressing night sweats/sleep issues. Stressed the importance of consistent use to " avoid breakthrough symptoms or endometrial issues.   Rx Prometrium 100mg 1 hour before bedtime    Vaginal Dryness   ? Emphasized benefits of local vaginal estrogen for genitourinary syndrome of menopause (GSM)   ? Provided instructions for application (e.g., cream, ring, or tablet) and stressed consistent use for optimal results.   Rx Estrace cream 1 gram vaginally daily for two weeks, then twice a week    Libido   ? to be discussed at follow up visit  ? Baseline labs testosterone levels reviewed.  Total tesotsterone 23, free tesotsterone 4.1 DHEAS 130     Sleep   ? Provided education on sleep hygiene: consistent sleep/wake times, limiting caffeine/alcohol, and avoiding screens before bed.   ? Encouraged stimulus control (bed for sleep and sex only) and relaxation techniques   ? Highlighted the impact of poor sleep on physical and mental health, including mood, memory, and overall quality of life.   Recommend magnesium glycinate 500mg 1 hour before bedtime    Exercise   ? Reinforced importance of 150 minutes of moderate aerobic activity per week and resistance training twice weekly to counteract bone loss and maintain lean muscle mass.   ? Discussed the accelerated loss of bone and muscle during danette- and post menopause and its role in reducing fracture risk and preserving physical function.       Diet   ? Reviewed protein intake recommendation (at least 85-90 grams) for muscle maintenance, with a focus on high-quality protein sources   ? Recommend 1200mg calcium and 800-1000IU vitamin D daily to support bone health.   ? Encouraged hydration (at least 2L/day) to improve fatigue, cognition, and overall metabolic function.   ? Suggested a Mediterranean-style diet for cardiometabolic health, with an emphasis on whole grains, healthy fats, fruits, and vegetables.     Additional Recommendations   ? Discussed routine follow-up to assess symptom management and adjust hormone therapy or lifestyle interventions as needed.    Follow up in 3 months       [1]   Patient Active Problem List  Diagnosis    Low testosterone level in female    Hypothyroidism    Hypoestrogenism    Low serum progesterone    Acute cystitis without hematuria    Chest pain

## 2025-07-17 ENCOUNTER — TELEPHONE (OUTPATIENT)
Dept: OBSTETRICS AND GYNECOLOGY | Facility: CLINIC | Age: 60
End: 2025-07-17
Payer: COMMERCIAL

## 2025-07-17 NOTE — TELEPHONE ENCOUNTER
Copied from CRM #[id number]. Topic: Appointments - Appointment Access  >> Jul 17, 2025  4:07 PM Clarissa wrote:  Type:  Appointment Request    Caller is requesting a sooner appointment.  Caller declined first available appointment listed below.  Caller will not accept being placed on the waitlist and is requesting a message be sent to doctor.  Name of Caller:pt   When is the first available appointment?  Symptoms:Virtual visit for follow up   Would the patient rather a call back or a response via [facility]? Call   Best Call Back Number:  Additional Information:

## 2025-07-22 ENCOUNTER — OFFICE VISIT (OUTPATIENT)
Dept: PRIMARY CARE CLINIC | Facility: CLINIC | Age: 60
End: 2025-07-22
Payer: COMMERCIAL

## 2025-07-22 VITALS
OXYGEN SATURATION: 99 % | DIASTOLIC BLOOD PRESSURE: 78 MMHG | WEIGHT: 186.75 LBS | TEMPERATURE: 99 F | RESPIRATION RATE: 16 BRPM | HEART RATE: 78 BPM | HEIGHT: 66 IN | BODY MASS INDEX: 30.01 KG/M2 | SYSTOLIC BLOOD PRESSURE: 132 MMHG

## 2025-07-22 DIAGNOSIS — E03.9 HYPOTHYROIDISM, UNSPECIFIED TYPE: ICD-10-CM

## 2025-07-22 DIAGNOSIS — L65.9 HAIR LOSS: Primary | ICD-10-CM

## 2025-07-22 DIAGNOSIS — N95.1 MENOPAUSAL SYNDROME: ICD-10-CM

## 2025-07-22 PROCEDURE — 99214 OFFICE O/P EST MOD 30 MIN: CPT | Mod: S$GLB,,, | Performed by: FAMILY MEDICINE

## 2025-07-22 PROCEDURE — 99999 PR PBB SHADOW E&M-EST. PATIENT-LVL III: CPT | Mod: PBBFAC,,, | Performed by: FAMILY MEDICINE

## 2025-07-22 NOTE — PROGRESS NOTES
Subjective:       Patient ID: Janae Tobias is a 60 y.o. female.    Chief Complaint: Annual Exam    HPI:  60-year-old white female in for annual physical---eating well--+BM--ambulating well  Menopausal syndrome on Estrace and permethrin  History UTI few weeks ago treated with Cipro  History hypothyroidism  Hair thinning went back to hormone MD like to recheck thyroid level and vitamin-D  Iodine copper B12 folic acid   Did lipids tests 3-4 weeks ago cholesterol was high   History of nephrolithiasis x5 had stent in the past and lithotripsy      ROS:  Skin: no psoriasis, eczema, skin cancer  HEENT: No headache, ocular pain, blurred vision, diplopia, epistaxis, hoarseness change in voice, thyroid --was on armour thyroid now with hair loss would like to have thyroid check  Lung: No pneumonia, asthma, Tb, wheezing, SOB,no smoking   Heart: No chest pain, ankle edema, palpitations, MI, boubacar murmur, hypertension, +hyperlipidemia- no stent bypass arrhythmia-  Abdomen: No nausea, vomiting, diarrhea, constipation, ulcers, hepatitis, gallbladder disease, melena, hematochezia, hematemesis  : + recent UTI treated with Cipro,no  renal disease, history nephrolithiasis x5 had stent in the past and lithotripsy  GYN uterine ablation--BTL--last Pap smear about 5 year--mammogram--July 9th movable mass in the left breast had diagnostic mammogram and ultrasound--left breast showed lobulated septated cyst no vascularity  MS: no fractures, O/A, lupus, rheumatoid, gout  Neuro: No dizziness, LOC, seizures   No diabetes, no anemia, no anxiety, no depression    4 children work out pt occupational therpy  therapy lives with      Objective:   Physical Exam:  General: Well nourished, well developed, no acute distress +overweight  Skin: No lesions  HEENT: Eyes PERRLA, EOM intact, nose patent, throat non-erythematous ears TMs clear  NECK: Supple, no bruits, No JVD, no nodes  Lungs: Clear, no rales, rhonchi, wheezing  Heart: Regular  rate and rhythm, no murmurs, gallops, or rubs  Abdomen: flat, bowel sounds positive, no tenderness, or organomegaly  MS: Range of motion and muscle strength intact slight swelling of the left 3rd PIP joint probable osteoarthritis  Neuro: Alert, CN intact, oriented X 3  Extremities: No cyanosis, clubbing, or edema         Assessment:       No diagnosis found.        Plan:       There are no diagnoses linked to this encounter.        Main reason for visit  UTI--Macrobid 100 mg 1 p.o. b.i.d. x7 days patient has peridium 1 p.o. t.i.d. x5 days  Multiple endocrine issues--history hypothyroidism/low testosterone/low estrogen/low progesterone/DHEA/Iodine --was on hormone estrogen progesterone and testosterone for awhile now with some breasts issues told should avoid hormones if has any type of breast tissue but appears to be more ductal--has dyspareunia told should talk to OBGYN if breast end up normal that she could try estrogen vaginal cream  Lab CBCs CMP lipids hemoglobin A1c T4 TSH  Overweight == exercise try to get to ideal body weight  History to nephrolithiasis no recent problems had x5  Hypothyroidism was on [place] thyroid now on no thyroid

## 2025-07-25 ENCOUNTER — PATIENT MESSAGE (OUTPATIENT)
Dept: PRIMARY CARE CLINIC | Facility: CLINIC | Age: 60
End: 2025-07-25
Payer: COMMERCIAL

## 2025-07-25 ENCOUNTER — PATIENT MESSAGE (OUTPATIENT)
Dept: ADMINISTRATIVE | Facility: HOSPITAL | Age: 60
End: 2025-07-25
Payer: COMMERCIAL

## 2025-07-25 ENCOUNTER — PATIENT OUTREACH (OUTPATIENT)
Dept: ADMINISTRATIVE | Facility: HOSPITAL | Age: 60
End: 2025-07-25
Payer: COMMERCIAL

## 2025-07-25 DIAGNOSIS — L65.9 HAIR LOSS: Primary | ICD-10-CM

## 2025-07-25 NOTE — PROGRESS NOTES
Health Maintenance Due   Topic Date Due    Hemoglobin A1c (Diabetic Prevention Screening)  Never done    Colorectal Cancer Screening  Never done    Shingles Vaccine (1 of 2) Never done    Lipid Panel  06/11/2022    Pneumococcal Vaccines (Age 50+) (2 of 2 - PCV) 07/06/2023    COVID-19 Vaccine (3 - 2024-25 season) 09/01/2024    Mammogram  11/07/2024     Immunizations - reviewed and updated   Care Everywhere - triggered   Care Teams - updated   Outreach - Chart review done. SHAHNAZ sent to [facility] in [place] for most recent mammogram and US of breast records

## 2025-07-25 NOTE — LETTER
AUTHORIZATION FOR RELEASE OF   CONFIDENTIAL INFORMATION    Dear [facility],    We are seeing Janae Tobias, date of birth 1965, in the clinic at [facility]. Joe Bender MD is the patient's PCP. Janae Tobias has an outstanding lab/procedure at the time we reviewed her chart. In order to help keep her health information updated, she has authorized us to request the following medical record(s):        (X)  MAMMOGRAM                                      (  )  COLONOSCOPY      (  )  PAP SMEAR                                          (  )  OUTSIDE LAB RESULTS     (  )  DEXA SCAN                                          (  )  EYE EXAM            (  )  FOOT EXAM                                          (  )  ENTIRE RECORD     (  )  OUTSIDE IMMUNIZATIONS                 (X)  ULTRASOUND        ATTN: ANGELICA      Please see signed medical release form for records       Please fax records to Joe Bender MD, [phone]      If you have any questions, please contact Angelica at [phone]          Patient Name: Janae Tobias  : 1965  Patient Phone #: [phone]

## 2025-07-26 DIAGNOSIS — Z12.11 SCREENING FOR COLON CANCER: ICD-10-CM

## 2025-07-31 ENCOUNTER — PATIENT MESSAGE (OUTPATIENT)
Dept: PRIMARY CARE CLINIC | Facility: CLINIC | Age: 60
End: 2025-07-31
Payer: COMMERCIAL

## 2025-07-31 DIAGNOSIS — N30.00 ACUTE CYSTITIS WITHOUT HEMATURIA: Primary | ICD-10-CM

## 2025-08-01 RX ORDER — CIPROFLOXACIN 500 MG/1
500 TABLET, FILM COATED ORAL 2 TIMES DAILY
Qty: 14 TABLET | Refills: 0 | Status: SHIPPED | OUTPATIENT
Start: 2025-08-01 | End: 2025-08-08

## 2025-08-03 ENCOUNTER — RESULTS FOLLOW-UP (OUTPATIENT)
Dept: PRIMARY CARE CLINIC | Facility: CLINIC | Age: 60
End: 2025-08-03
Payer: COMMERCIAL

## 2025-08-03 RX ORDER — ERGOCALCIFEROL 1.25 MG/1
50000 CAPSULE ORAL
Qty: 12 CAPSULE | Refills: 3 | Status: SHIPPED | OUTPATIENT
Start: 2025-08-03

## 2025-08-08 ENCOUNTER — OFFICE VISIT (OUTPATIENT)
Dept: OBSTETRICS AND GYNECOLOGY | Facility: CLINIC | Age: 60
End: 2025-08-08
Payer: COMMERCIAL

## 2025-08-08 DIAGNOSIS — F52.0 HYPOACTIVE SEXUAL DESIRE DISORDER: Primary | ICD-10-CM

## 2025-08-08 RX ORDER — TESTOSTERONE 50 MG/5G
0.5 GEL TRANSDERMAL DAILY
Qty: 150 G | Refills: 0 | Status: SHIPPED | OUTPATIENT
Start: 2025-08-08

## 2025-08-08 NOTE — PROGRESS NOTES
The patient location is: [place]  The chief complaint leading to consultation is: Low libido    Visit type: audiovisual    Face to Face time with patient: 20  30 minutes of total time spent on the encounter, which includes face to face time and non-face to face time preparing to see the patient (eg, review of tests), Obtaining and/or reviewing separately obtained history, Documenting clinical information in the electronic or other health record, Independently interpreting results (not separately reported) and communicating results to the patient/family/caregiver, or Care coordination (not separately reported).         Each patient to whom he or she provides medical services by telemedicine is:  (1) informed of the relationship between the physician and patient and the respective role of any other health care provider with respect to management of the patient; and (2) notified that he or she may decline to receive medical services by telemedicine and may withdraw from such care at any time.    Notes:             Patient is currently Estradiol patch 0.0375mg twice a week and Prometrium 100mg at night.  She is also taking vaginal estrogen.  She states that the vaginal estrogen has improved her vaginal dryness and denies any pain with intercourse.  Explained how decreased libido is broken down into four categories including desire, arousal, orgasm and pain.  She denies pain.  She has decreased desire, arousal and orgasm.  Explained that orgasm can often be difficult to achieve or be anti-climatic as women get older due to decreased sensitivity to touch but women can often still achieve orgasm through vibration or masturbation.  Testosterone can help with desire, arousal and orgasm.  Libido   ? Reviewed testosterone therapy as an option for hypoactive sexual desire disorder (HSDD): ? Delivery methods (gel, injection, pellets) and appropriate dosing for women as well as risks and benefits of each method   ? Risks (e.g.,  acne, hair growth, hair loss, deepening of the voice and clitoromegaly) and off-label status (not FDA-approved for women). Patient is aware that it is not covered by insurance although she states that she previously received testosterone injections every other week and it was covered by insurance.  ? Patient informed of Rupture cost estimation for gel.  Explained how to get coupon and use at the pharmacy.  ? Reviewed baseline labs done at [facility].  Total testosterone 23.  Free testosterone 4.1  Will recheck in 8 weeks  All questions answered

## 2025-08-20 ENCOUNTER — PATIENT OUTREACH (OUTPATIENT)
Dept: ADMINISTRATIVE | Facility: HOSPITAL | Age: 60
End: 2025-08-20
Payer: COMMERCIAL

## 2025-08-20 DIAGNOSIS — N95.1 MENOPAUSAL SYNDROME: ICD-10-CM

## 2025-08-20 DIAGNOSIS — E03.9 HYPOTHYROIDISM, UNSPECIFIED TYPE: Primary | ICD-10-CM

## 2025-08-26 ENCOUNTER — PATIENT MESSAGE (OUTPATIENT)
Dept: OBSTETRICS AND GYNECOLOGY | Facility: CLINIC | Age: 60
End: 2025-08-26
Payer: COMMERCIAL

## 2025-09-02 ENCOUNTER — TELEPHONE (OUTPATIENT)
Dept: OBSTETRICS AND GYNECOLOGY | Facility: CLINIC | Age: 60
End: 2025-09-02
Payer: COMMERCIAL

## 2025-09-04 ENCOUNTER — PATIENT OUTREACH (OUTPATIENT)
Dept: ADMINISTRATIVE | Facility: HOSPITAL | Age: 60
End: 2025-09-04
Payer: COMMERCIAL